# Patient Record
Sex: FEMALE | Race: OTHER | NOT HISPANIC OR LATINO
[De-identification: names, ages, dates, MRNs, and addresses within clinical notes are randomized per-mention and may not be internally consistent; named-entity substitution may affect disease eponyms.]

---

## 2017-01-07 ENCOUNTER — FORM ENCOUNTER (OUTPATIENT)
Age: 52
End: 2017-01-07

## 2017-01-08 ENCOUNTER — OUTPATIENT (OUTPATIENT)
Dept: OUTPATIENT SERVICES | Facility: HOSPITAL | Age: 52
LOS: 1 days | End: 2017-01-08
Payer: MEDICAID

## 2017-01-08 ENCOUNTER — APPOINTMENT (OUTPATIENT)
Dept: NUCLEAR MEDICINE | Facility: IMAGING CENTER | Age: 52
End: 2017-01-08

## 2017-01-08 DIAGNOSIS — C56.9 MALIGNANT NEOPLASM OF UNSPECIFIED OVARY: ICD-10-CM

## 2017-01-08 DIAGNOSIS — Z98.89 OTHER SPECIFIED POSTPROCEDURAL STATES: Chronic | ICD-10-CM

## 2017-01-08 PROCEDURE — A9552: CPT

## 2017-01-08 PROCEDURE — 78815 PET IMAGE W/CT SKULL-THIGH: CPT

## 2017-01-11 ENCOUNTER — APPOINTMENT (OUTPATIENT)
Dept: GYNECOLOGIC ONCOLOGY | Facility: CLINIC | Age: 52
End: 2017-01-11

## 2017-01-11 VITALS
WEIGHT: 156 LBS | DIASTOLIC BLOOD PRESSURE: 80 MMHG | SYSTOLIC BLOOD PRESSURE: 120 MMHG | HEIGHT: 65 IN | BODY MASS INDEX: 25.99 KG/M2

## 2017-03-09 ENCOUNTER — OUTPATIENT (OUTPATIENT)
Dept: OUTPATIENT SERVICES | Facility: HOSPITAL | Age: 52
LOS: 1 days | Discharge: HOME | End: 2017-03-09

## 2017-03-09 DIAGNOSIS — Z98.89 OTHER SPECIFIED POSTPROCEDURAL STATES: Chronic | ICD-10-CM

## 2017-03-09 DIAGNOSIS — C56.9 MALIGNANT NEOPLASM OF UNSPECIFIED OVARY: ICD-10-CM

## 2017-03-14 ENCOUNTER — APPOINTMENT (OUTPATIENT)
Dept: HEMATOLOGY ONCOLOGY | Facility: CLINIC | Age: 52
End: 2017-03-14

## 2017-03-28 ENCOUNTER — RESULT REVIEW (OUTPATIENT)
Age: 52
End: 2017-03-28

## 2017-03-29 ENCOUNTER — APPOINTMENT (OUTPATIENT)
Dept: HEMATOLOGY ONCOLOGY | Facility: CLINIC | Age: 52
End: 2017-03-29

## 2017-03-29 VITALS
HEART RATE: 94 BPM | SYSTOLIC BLOOD PRESSURE: 130 MMHG | BODY MASS INDEX: 27.96 KG/M2 | RESPIRATION RATE: 16 BRPM | WEIGHT: 167.99 LBS | OXYGEN SATURATION: 100 % | TEMPERATURE: 98.4 F | DIASTOLIC BLOOD PRESSURE: 84 MMHG

## 2017-03-29 LAB
ALBUMIN SERPL ELPH-MCNC: 3.8 G/DL
ALP BLD-CCNC: 83 U/L
ALT SERPL-CCNC: 20 U/L
ANION GAP SERPL CALC-SCNC: 15 MMOL/L
AST SERPL-CCNC: 16 U/L
BILIRUB SERPL-MCNC: <0.2 MG/DL
BUN SERPL-MCNC: 13 MG/DL
CALCIUM SERPL-MCNC: 9.2 MG/DL
CEA SERPL-MCNC: 1.5 NG/ML
CHLORIDE SERPL-SCNC: 104 MMOL/L
CO2 SERPL-SCNC: 24 MMOL/L
CREAT SERPL-MCNC: 0.56 MG/DL
GLUCOSE SERPL-MCNC: 159 MG/DL
HCT VFR BLD CALC: 33.5 % — LOW (ref 34.5–45)
HGB BLD-MCNC: 11.2 G/DL — LOW (ref 11.5–15.5)
MCHC RBC-ENTMCNC: 29.7 PG — SIGNIFICANT CHANGE UP (ref 27–34)
MCHC RBC-ENTMCNC: 33.5 G/DL — SIGNIFICANT CHANGE UP (ref 32–36)
MCV RBC AUTO: 88.8 FL — SIGNIFICANT CHANGE UP (ref 80–100)
PLATELET # BLD AUTO: 307 K/UL — SIGNIFICANT CHANGE UP (ref 150–400)
POTASSIUM SERPL-SCNC: 4.4 MMOL/L
PROT SERPL-MCNC: 6.9 G/DL
RBC # BLD: 3.77 M/UL — LOW (ref 3.8–5.2)
RBC # FLD: 11.8 % — SIGNIFICANT CHANGE UP (ref 10.3–14.5)
SODIUM SERPL-SCNC: 143 MMOL/L
WBC # BLD: 9.3 K/UL — SIGNIFICANT CHANGE UP (ref 3.8–10.5)
WBC # FLD AUTO: 9.3 K/UL — SIGNIFICANT CHANGE UP (ref 3.8–10.5)

## 2017-03-30 LAB — CANCER AG125 SERPL-ACNC: 11 U/ML

## 2017-04-05 ENCOUNTER — FORM ENCOUNTER (OUTPATIENT)
Age: 52
End: 2017-04-05

## 2017-04-06 ENCOUNTER — OUTPATIENT (OUTPATIENT)
Dept: OUTPATIENT SERVICES | Facility: HOSPITAL | Age: 52
LOS: 1 days | End: 2017-04-06
Payer: MEDICAID

## 2017-04-06 ENCOUNTER — APPOINTMENT (OUTPATIENT)
Dept: CT IMAGING | Facility: IMAGING CENTER | Age: 52
End: 2017-04-06

## 2017-04-06 ENCOUNTER — APPOINTMENT (OUTPATIENT)
Dept: MAMMOGRAPHY | Facility: IMAGING CENTER | Age: 52
End: 2017-04-06

## 2017-04-06 DIAGNOSIS — C56.9 MALIGNANT NEOPLASM OF UNSPECIFIED OVARY: ICD-10-CM

## 2017-04-06 DIAGNOSIS — Z98.89 OTHER SPECIFIED POSTPROCEDURAL STATES: Chronic | ICD-10-CM

## 2017-04-06 PROCEDURE — 77063 BREAST TOMOSYNTHESIS BI: CPT

## 2017-04-06 PROCEDURE — 74177 CT ABD & PELVIS W/CONTRAST: CPT

## 2017-04-06 PROCEDURE — 77067 SCR MAMMO BI INCL CAD: CPT

## 2017-05-03 ENCOUNTER — APPOINTMENT (OUTPATIENT)
Dept: GYNECOLOGIC ONCOLOGY | Facility: CLINIC | Age: 52
End: 2017-05-03

## 2017-05-03 VITALS
DIASTOLIC BLOOD PRESSURE: 80 MMHG | SYSTOLIC BLOOD PRESSURE: 122 MMHG | WEIGHT: 163 LBS | BODY MASS INDEX: 27.16 KG/M2 | HEIGHT: 65 IN

## 2017-05-08 ENCOUNTER — APPOINTMENT (OUTPATIENT)
Dept: SURGERY | Facility: CLINIC | Age: 52
End: 2017-05-08

## 2017-05-08 VITALS
DIASTOLIC BLOOD PRESSURE: 82 MMHG | WEIGHT: 163 LBS | SYSTOLIC BLOOD PRESSURE: 130 MMHG | TEMPERATURE: 97.9 F | HEIGHT: 65 IN | HEART RATE: 79 BPM | BODY MASS INDEX: 27.16 KG/M2

## 2017-05-08 RX ORDER — CALCIUM CITRATE/VITAMIN D3 200MG-6.25
TABLET ORAL
Refills: 0 | Status: ACTIVE | COMMUNITY

## 2017-05-11 ENCOUNTER — OUTPATIENT (OUTPATIENT)
Dept: OUTPATIENT SERVICES | Facility: HOSPITAL | Age: 52
LOS: 1 days | Discharge: HOME | End: 2017-05-11

## 2017-05-11 VITALS
RESPIRATION RATE: 16 BRPM | SYSTOLIC BLOOD PRESSURE: 115 MMHG | WEIGHT: 164.24 LBS | DIASTOLIC BLOOD PRESSURE: 75 MMHG | HEART RATE: 90 BPM | TEMPERATURE: 98 F | HEIGHT: 65 IN | OXYGEN SATURATION: 98 %

## 2017-05-11 DIAGNOSIS — Z98.89 OTHER SPECIFIED POSTPROCEDURAL STATES: Chronic | ICD-10-CM

## 2017-05-11 DIAGNOSIS — Z01.818 ENCOUNTER FOR OTHER PREPROCEDURAL EXAMINATION: ICD-10-CM

## 2017-05-11 DIAGNOSIS — Z90.710 ACQUIRED ABSENCE OF BOTH CERVIX AND UTERUS: Chronic | ICD-10-CM

## 2017-05-11 DIAGNOSIS — K43.2 INCISIONAL HERNIA WITHOUT OBSTRUCTION OR GANGRENE: ICD-10-CM

## 2017-05-11 LAB
ANION GAP SERPL CALC-SCNC: 5 MMOL/L — SIGNIFICANT CHANGE UP (ref 5–17)
APTT BLD: 32.3 SEC — SIGNIFICANT CHANGE UP (ref 27.5–37.4)
BASOPHILS # BLD AUTO: 0.1 K/UL — SIGNIFICANT CHANGE UP (ref 0–0.2)
BASOPHILS NFR BLD AUTO: 0.8 % — SIGNIFICANT CHANGE UP (ref 0–2)
BUN SERPL-MCNC: 16 MG/DL — SIGNIFICANT CHANGE UP (ref 7–23)
CALCIUM SERPL-MCNC: 9.3 MG/DL — SIGNIFICANT CHANGE UP (ref 8.5–10.1)
CHLORIDE SERPL-SCNC: 105 MMOL/L — SIGNIFICANT CHANGE UP (ref 96–108)
CO2 SERPL-SCNC: 30 MMOL/L — SIGNIFICANT CHANGE UP (ref 22–31)
CREAT SERPL-MCNC: 0.61 MG/DL — SIGNIFICANT CHANGE UP (ref 0.5–1.3)
EOSINOPHIL # BLD AUTO: 0.1 K/UL — SIGNIFICANT CHANGE UP (ref 0–0.5)
EOSINOPHIL NFR BLD AUTO: 1.7 % — SIGNIFICANT CHANGE UP (ref 0–6)
GLUCOSE SERPL-MCNC: 95 MG/DL — SIGNIFICANT CHANGE UP (ref 70–99)
HCT VFR BLD CALC: 35.8 % — SIGNIFICANT CHANGE UP (ref 34.5–45)
HGB BLD-MCNC: 12 G/DL — SIGNIFICANT CHANGE UP (ref 11.5–15.5)
INR BLD: 1.04 RATIO — SIGNIFICANT CHANGE UP (ref 0.88–1.16)
LYMPHOCYTES # BLD AUTO: 2.6 K/UL — SIGNIFICANT CHANGE UP (ref 1–3.3)
LYMPHOCYTES # BLD AUTO: 28.9 % — SIGNIFICANT CHANGE UP (ref 13–44)
MCHC RBC-ENTMCNC: 29 PG — SIGNIFICANT CHANGE UP (ref 27–34)
MCHC RBC-ENTMCNC: 33.6 GM/DL — SIGNIFICANT CHANGE UP (ref 32–36)
MCV RBC AUTO: 86.3 FL — SIGNIFICANT CHANGE UP (ref 80–100)
MONOCYTES # BLD AUTO: 0.6 K/UL — SIGNIFICANT CHANGE UP (ref 0–0.9)
MONOCYTES NFR BLD AUTO: 6.9 % — SIGNIFICANT CHANGE UP (ref 2–14)
NEUTROPHILS # BLD AUTO: 5.5 K/UL — SIGNIFICANT CHANGE UP (ref 1.8–7.4)
NEUTROPHILS NFR BLD AUTO: 61.7 % — SIGNIFICANT CHANGE UP (ref 43–77)
PLATELET # BLD AUTO: 360 K/UL — SIGNIFICANT CHANGE UP (ref 150–400)
POTASSIUM SERPL-MCNC: 4.5 MMOL/L — SIGNIFICANT CHANGE UP (ref 3.5–5.3)
POTASSIUM SERPL-SCNC: 4.5 MMOL/L — SIGNIFICANT CHANGE UP (ref 3.5–5.3)
PROTHROM AB SERPL-ACNC: 11.4 SEC — SIGNIFICANT CHANGE UP (ref 9.8–12.7)
RBC # BLD: 4.14 M/UL — SIGNIFICANT CHANGE UP (ref 3.8–5.2)
RBC # FLD: 13.4 % — SIGNIFICANT CHANGE UP (ref 11–15)
SODIUM SERPL-SCNC: 140 MMOL/L — SIGNIFICANT CHANGE UP (ref 135–145)
WBC # BLD: 9 K/UL — SIGNIFICANT CHANGE UP (ref 3.8–10.5)
WBC # FLD AUTO: 9 K/UL — SIGNIFICANT CHANGE UP (ref 3.8–10.5)

## 2017-05-11 NOTE — H&P PST ADULT - NSANTHOSAYNRD_GEN_A_CORE
No. CORNELIUS screening performed.  STOP BANG Legend: 0-2 = LOW Risk; 3-4 = INTERMEDIATE Risk; 5-8 = HIGH Risk

## 2017-05-16 ENCOUNTER — OUTPATIENT (OUTPATIENT)
Dept: OUTPATIENT SERVICES | Facility: HOSPITAL | Age: 52
LOS: 1 days | Discharge: HOME | End: 2017-05-16
Payer: COMMERCIAL

## 2017-05-16 ENCOUNTER — APPOINTMENT (OUTPATIENT)
Dept: SURGERY | Facility: HOSPITAL | Age: 52
End: 2017-05-16

## 2017-05-16 ENCOUNTER — TRANSCRIPTION ENCOUNTER (OUTPATIENT)
Age: 52
End: 2017-05-16

## 2017-05-16 VITALS
HEART RATE: 80 BPM | TEMPERATURE: 97 F | DIASTOLIC BLOOD PRESSURE: 70 MMHG | RESPIRATION RATE: 17 BRPM | WEIGHT: 164.24 LBS | OXYGEN SATURATION: 100 % | SYSTOLIC BLOOD PRESSURE: 110 MMHG | HEIGHT: 65 IN

## 2017-05-16 VITALS
DIASTOLIC BLOOD PRESSURE: 63 MMHG | RESPIRATION RATE: 16 BRPM | SYSTOLIC BLOOD PRESSURE: 96 MMHG | OXYGEN SATURATION: 98 % | HEART RATE: 75 BPM

## 2017-05-16 DIAGNOSIS — Z98.89 OTHER SPECIFIED POSTPROCEDURAL STATES: Chronic | ICD-10-CM

## 2017-05-16 DIAGNOSIS — Z90.710 ACQUIRED ABSENCE OF BOTH CERVIX AND UTERUS: Chronic | ICD-10-CM

## 2017-05-16 PROCEDURE — 49654: CPT

## 2017-05-16 PROCEDURE — 49654: CPT | Mod: AS

## 2017-05-16 RX ORDER — SODIUM CHLORIDE 9 MG/ML
1000 INJECTION, SOLUTION INTRAVENOUS
Qty: 0 | Refills: 0 | Status: DISCONTINUED | OUTPATIENT
Start: 2017-05-16 | End: 2017-05-16

## 2017-05-16 RX ORDER — MORPHINE SULFATE 50 MG/1
2 CAPSULE, EXTENDED RELEASE ORAL
Qty: 0 | Refills: 0 | Status: DISCONTINUED | OUTPATIENT
Start: 2017-05-16 | End: 2017-05-16

## 2017-05-16 RX ORDER — ONDANSETRON 8 MG/1
4 TABLET, FILM COATED ORAL ONCE
Qty: 0 | Refills: 0 | Status: DISCONTINUED | OUTPATIENT
Start: 2017-05-16 | End: 2017-05-16

## 2017-05-16 RX ADMIN — MORPHINE SULFATE 2 MILLIGRAM(S): 50 CAPSULE, EXTENDED RELEASE ORAL at 10:01

## 2017-05-16 RX ADMIN — SODIUM CHLORIDE 100 MILLILITER(S): 9 INJECTION, SOLUTION INTRAVENOUS at 10:55

## 2017-05-16 RX ADMIN — MORPHINE SULFATE 2 MILLIGRAM(S): 50 CAPSULE, EXTENDED RELEASE ORAL at 11:30

## 2017-05-16 NOTE — ASU DISCHARGE PLAN (ADULT/PEDIATRIC). - MEDICATION SUMMARY - MEDICATIONS TO CHANGE
Please order labs for 2-21 appointment. Thanks, Rae TAVERAS   I will SWITCH the dose or number of times a day I take the medications listed below when I get home from the hospital:  None

## 2017-05-16 NOTE — ASU DISCHARGE PLAN (ADULT/PEDIATRIC). - MEDICATION SUMMARY - MEDICATIONS TO TAKE
I will START or STAY ON the medications listed below when I get home from the hospital:    Percocet 5/325 325 mg-5 mg oral tablet  -- 1-2 tab(s) by mouth every 4 to 6 hours, As Needed MDD:6 tabs - for severe pain  -- Caution federal law prohibits the transfer of this drug to any person other  than the person for whom it was prescribed.  May cause drowsiness.  Alcohol may intensify this effect.  Use care when operating dangerous machinery.  This prescription cannot be refilled.  This product contains acetaminophen.  Do not use  with any other product containing acetaminophen to prevent possible liver damage.  Using more of this medication than prescribed may cause serious breathing problems.    -- Indication: For severe pain    Citracal  -- 1 tab(s) by mouth once a day  -- Indication: For .    multivitamin  -- 1 tab(s) by mouth once a day  -- Indication: For .

## 2017-05-16 NOTE — ASU DISCHARGE PLAN (ADULT/PEDIATRIC). - NOTIFY
Bleeding that does not stop/Fever greater than 101/Unable to Urinate/Swelling that continues/Persistent Nausea and Vomiting

## 2017-05-22 DIAGNOSIS — Z91.013 ALLERGY TO SEAFOOD: ICD-10-CM

## 2017-05-22 DIAGNOSIS — Z90.79 ACQUIRED ABSENCE OF OTHER GENITAL ORGAN(S): ICD-10-CM

## 2017-05-22 DIAGNOSIS — K43.2 INCISIONAL HERNIA WITHOUT OBSTRUCTION OR GANGRENE: ICD-10-CM

## 2017-05-22 DIAGNOSIS — Z85.43 PERSONAL HISTORY OF MALIGNANT NEOPLASM OF OVARY: ICD-10-CM

## 2017-05-24 ENCOUNTER — APPOINTMENT (OUTPATIENT)
Dept: SURGERY | Facility: CLINIC | Age: 52
End: 2017-05-24

## 2017-05-24 VITALS
BODY MASS INDEX: 27.16 KG/M2 | DIASTOLIC BLOOD PRESSURE: 81 MMHG | WEIGHT: 163 LBS | HEIGHT: 65 IN | SYSTOLIC BLOOD PRESSURE: 128 MMHG | TEMPERATURE: 97.5 F | HEART RATE: 94 BPM

## 2017-06-26 ENCOUNTER — APPOINTMENT (OUTPATIENT)
Dept: SURGERY | Facility: CLINIC | Age: 52
End: 2017-06-26

## 2017-06-28 ENCOUNTER — APPOINTMENT (OUTPATIENT)
Dept: SURGERY | Facility: CLINIC | Age: 52
End: 2017-06-28

## 2017-06-28 VITALS
HEART RATE: 80 BPM | DIASTOLIC BLOOD PRESSURE: 79 MMHG | HEIGHT: 65 IN | TEMPERATURE: 97.7 F | SYSTOLIC BLOOD PRESSURE: 130 MMHG | WEIGHT: 163 LBS | BODY MASS INDEX: 27.16 KG/M2

## 2017-07-05 ENCOUNTER — TRANSCRIPTION ENCOUNTER (OUTPATIENT)
Age: 52
End: 2017-07-05

## 2017-08-03 ENCOUNTER — OUTPATIENT (OUTPATIENT)
Dept: OUTPATIENT SERVICES | Facility: HOSPITAL | Age: 52
LOS: 1 days | Discharge: HOME | End: 2017-08-03

## 2017-08-03 DIAGNOSIS — C56.9 MALIGNANT NEOPLASM OF UNSPECIFIED OVARY: ICD-10-CM

## 2017-08-03 DIAGNOSIS — Z90.710 ACQUIRED ABSENCE OF BOTH CERVIX AND UTERUS: Chronic | ICD-10-CM

## 2017-08-03 DIAGNOSIS — Z98.89 OTHER SPECIFIED POSTPROCEDURAL STATES: Chronic | ICD-10-CM

## 2017-08-08 ENCOUNTER — APPOINTMENT (OUTPATIENT)
Dept: HEMATOLOGY ONCOLOGY | Facility: CLINIC | Age: 52
End: 2017-08-08
Payer: MEDICAID

## 2017-08-08 ENCOUNTER — RESULT REVIEW (OUTPATIENT)
Age: 52
End: 2017-08-08

## 2017-08-08 VITALS
RESPIRATION RATE: 16 BRPM | DIASTOLIC BLOOD PRESSURE: 76 MMHG | SYSTOLIC BLOOD PRESSURE: 112 MMHG | HEART RATE: 72 BPM | BODY MASS INDEX: 27.33 KG/M2 | TEMPERATURE: 97.5 F | OXYGEN SATURATION: 99 % | WEIGHT: 164.24 LBS

## 2017-08-08 LAB
ALBUMIN SERPL ELPH-MCNC: 4.3 G/DL
ALP BLD-CCNC: 81 U/L
ALT SERPL-CCNC: 20 U/L
ANION GAP SERPL CALC-SCNC: 14 MMOL/L
AST SERPL-CCNC: 19 U/L
BILIRUB SERPL-MCNC: 0.3 MG/DL
BUN SERPL-MCNC: 14 MG/DL
CALCIUM SERPL-MCNC: 10.1 MG/DL
CANCER AG125 SERPL-ACNC: 11 U/ML
CHLORIDE SERPL-SCNC: 109 MMOL/L
CO2 SERPL-SCNC: 22 MMOL/L
CREAT SERPL-MCNC: 0.56 MG/DL
GLUCOSE SERPL-MCNC: 117 MG/DL
HCT VFR BLD CALC: 34.5 % — SIGNIFICANT CHANGE UP (ref 34.5–45)
HGB BLD-MCNC: 11.3 G/DL — LOW (ref 11.5–15.5)
MCHC RBC-ENTMCNC: 28.4 PG — SIGNIFICANT CHANGE UP (ref 27–34)
MCHC RBC-ENTMCNC: 32.8 G/DL — SIGNIFICANT CHANGE UP (ref 32–36)
MCV RBC AUTO: 86.5 FL — SIGNIFICANT CHANGE UP (ref 80–100)
PLATELET # BLD AUTO: 286 K/UL — SIGNIFICANT CHANGE UP (ref 150–400)
POTASSIUM SERPL-SCNC: 4.7 MMOL/L
PROT SERPL-MCNC: 7.5 G/DL
RBC # BLD: 3.98 M/UL — SIGNIFICANT CHANGE UP (ref 3.8–5.2)
RBC # FLD: 14.6 % — HIGH (ref 10.3–14.5)
SODIUM SERPL-SCNC: 145 MMOL/L
WBC # BLD: 8.7 K/UL — SIGNIFICANT CHANGE UP (ref 3.8–10.5)
WBC # FLD AUTO: 8.7 K/UL — SIGNIFICANT CHANGE UP (ref 3.8–10.5)

## 2017-08-08 PROCEDURE — 99214 OFFICE O/P EST MOD 30 MIN: CPT

## 2017-08-08 RX ORDER — OXYCODONE AND ACETAMINOPHEN 5; 325 MG/1; MG/1
5-325 TABLET ORAL
Qty: 20 | Refills: 0 | Status: DISCONTINUED | COMMUNITY
Start: 2017-05-16 | End: 2017-08-08

## 2017-08-26 ENCOUNTER — TRANSCRIPTION ENCOUNTER (OUTPATIENT)
Age: 52
End: 2017-08-26

## 2017-09-07 ENCOUNTER — APPOINTMENT (OUTPATIENT)
Dept: GYNECOLOGIC ONCOLOGY | Facility: CLINIC | Age: 52
End: 2017-09-07
Payer: MEDICAID

## 2017-09-07 VITALS
SYSTOLIC BLOOD PRESSURE: 120 MMHG | HEIGHT: 65 IN | DIASTOLIC BLOOD PRESSURE: 78 MMHG | WEIGHT: 165 LBS | BODY MASS INDEX: 27.49 KG/M2

## 2017-09-07 PROCEDURE — 99214 OFFICE O/P EST MOD 30 MIN: CPT

## 2017-09-11 LAB
APPEARANCE: CLEAR
BACTERIA UR CULT: NORMAL
BACTERIA: NEGATIVE
BILIRUBIN URINE: NEGATIVE
BLOOD URINE: NEGATIVE
COLOR: YELLOW
GLUCOSE QUALITATIVE U: NORMAL MG/DL
HYALINE CASTS: 0 /LPF
KETONES URINE: NEGATIVE
LEUKOCYTE ESTERASE URINE: NEGATIVE
MICROSCOPIC-UA: NORMAL
NITRITE URINE: NEGATIVE
PH URINE: 7
PROTEIN URINE: NEGATIVE MG/DL
RED BLOOD CELLS URINE: 2 /HPF
SPECIFIC GRAVITY URINE: 1.01
SQUAMOUS EPITHELIAL CELLS: 0 /HPF
UROBILINOGEN URINE: NORMAL MG/DL
WHITE BLOOD CELLS URINE: 0 /HPF

## 2017-09-27 ENCOUNTER — LABORATORY RESULT (OUTPATIENT)
Age: 52
End: 2017-09-27

## 2017-09-27 ENCOUNTER — RESULT REVIEW (OUTPATIENT)
Age: 52
End: 2017-09-27

## 2017-09-28 ENCOUNTER — MOBILE ON CALL (OUTPATIENT)
Age: 52
End: 2017-09-28

## 2017-09-28 ENCOUNTER — APPOINTMENT (OUTPATIENT)
Dept: DERMATOLOGY | Facility: CLINIC | Age: 52
End: 2017-09-28
Payer: MEDICAID

## 2017-09-28 VITALS
SYSTOLIC BLOOD PRESSURE: 120 MMHG | DIASTOLIC BLOOD PRESSURE: 72 MMHG | BODY MASS INDEX: 27.99 KG/M2 | HEIGHT: 65 IN | WEIGHT: 168 LBS

## 2017-09-28 DIAGNOSIS — D48.5 NEOPLASM OF UNCERTAIN BEHAVIOR OF SKIN: ICD-10-CM

## 2017-09-28 PROCEDURE — 11101 BIOPSY SKIN SUBQ&/MUCOUS MEMBRANE EA ADDL LESN: CPT

## 2017-09-28 PROCEDURE — 99203 OFFICE O/P NEW LOW 30 MIN: CPT | Mod: 25

## 2017-09-28 PROCEDURE — 11100 BX SKIN SUBCUTANEOUS&/MUCOUS MEMBRANE 1 LESION: CPT

## 2017-10-14 ENCOUNTER — FORM ENCOUNTER (OUTPATIENT)
Age: 52
End: 2017-10-14

## 2017-10-15 ENCOUNTER — OUTPATIENT (OUTPATIENT)
Dept: OUTPATIENT SERVICES | Facility: HOSPITAL | Age: 52
LOS: 1 days | End: 2017-10-15
Payer: MEDICAID

## 2017-10-15 ENCOUNTER — APPOINTMENT (OUTPATIENT)
Dept: CT IMAGING | Facility: IMAGING CENTER | Age: 52
End: 2017-10-15
Payer: MEDICAID

## 2017-10-15 DIAGNOSIS — C56.9 MALIGNANT NEOPLASM OF UNSPECIFIED OVARY: ICD-10-CM

## 2017-10-15 DIAGNOSIS — Z98.89 OTHER SPECIFIED POSTPROCEDURAL STATES: Chronic | ICD-10-CM

## 2017-10-15 DIAGNOSIS — Z90.710 ACQUIRED ABSENCE OF BOTH CERVIX AND UTERUS: Chronic | ICD-10-CM

## 2017-10-15 PROCEDURE — 74177 CT ABD & PELVIS W/CONTRAST: CPT

## 2017-10-15 PROCEDURE — 74177 CT ABD & PELVIS W/CONTRAST: CPT | Mod: 26

## 2017-10-25 ENCOUNTER — OUTPATIENT (OUTPATIENT)
Dept: OUTPATIENT SERVICES | Facility: HOSPITAL | Age: 52
LOS: 1 days | Discharge: HOME | End: 2017-10-25

## 2017-10-25 DIAGNOSIS — Z90.710 ACQUIRED ABSENCE OF BOTH CERVIX AND UTERUS: Chronic | ICD-10-CM

## 2017-10-25 DIAGNOSIS — C56.9 MALIGNANT NEOPLASM OF UNSPECIFIED OVARY: ICD-10-CM

## 2017-10-25 DIAGNOSIS — Z98.89 OTHER SPECIFIED POSTPROCEDURAL STATES: Chronic | ICD-10-CM

## 2017-10-31 ENCOUNTER — APPOINTMENT (OUTPATIENT)
Dept: HEMATOLOGY ONCOLOGY | Facility: CLINIC | Age: 52
End: 2017-10-31
Payer: MEDICAID

## 2017-10-31 ENCOUNTER — RESULT REVIEW (OUTPATIENT)
Age: 52
End: 2017-10-31

## 2017-10-31 VITALS
HEART RATE: 87 BPM | RESPIRATION RATE: 16 BRPM | DIASTOLIC BLOOD PRESSURE: 77 MMHG | OXYGEN SATURATION: 99 % | WEIGHT: 166.67 LBS | SYSTOLIC BLOOD PRESSURE: 116 MMHG | BODY MASS INDEX: 27.73 KG/M2 | TEMPERATURE: 98 F

## 2017-10-31 LAB
HCT VFR BLD CALC: 34.9 % — SIGNIFICANT CHANGE UP (ref 34.5–45)
HGB BLD-MCNC: 11.5 G/DL — SIGNIFICANT CHANGE UP (ref 11.5–15.5)
MCHC RBC-ENTMCNC: 28.8 PG — SIGNIFICANT CHANGE UP (ref 27–34)
MCHC RBC-ENTMCNC: 33 G/DL — SIGNIFICANT CHANGE UP (ref 32–36)
MCV RBC AUTO: 87.5 FL — SIGNIFICANT CHANGE UP (ref 80–100)
PLATELET # BLD AUTO: 294 K/UL — SIGNIFICANT CHANGE UP (ref 150–400)
RBC # BLD: 3.99 M/UL — SIGNIFICANT CHANGE UP (ref 3.8–5.2)
RBC # FLD: 13.2 % — SIGNIFICANT CHANGE UP (ref 10.3–14.5)
WBC # BLD: 11 K/UL — HIGH (ref 3.8–10.5)
WBC # FLD AUTO: 11 K/UL — HIGH (ref 3.8–10.5)

## 2017-10-31 PROCEDURE — 99214 OFFICE O/P EST MOD 30 MIN: CPT

## 2017-11-01 LAB
ALBUMIN SERPL ELPH-MCNC: 3.8 G/DL
ALP BLD-CCNC: 82 U/L
ALT SERPL-CCNC: 25 U/L
ANION GAP SERPL CALC-SCNC: 11 MMOL/L
AST SERPL-CCNC: 21 U/L
BILIRUB SERPL-MCNC: <0.2 MG/DL
BUN SERPL-MCNC: 23 MG/DL
CALCIUM SERPL-MCNC: 9.3 MG/DL
CANCER AG125 SERPL-ACNC: 12 U/ML
CHLORIDE SERPL-SCNC: 107 MMOL/L
CO2 SERPL-SCNC: 24 MMOL/L
CREAT SERPL-MCNC: 0.61 MG/DL
GLUCOSE SERPL-MCNC: 109 MG/DL
POTASSIUM SERPL-SCNC: 4.4 MMOL/L
PROT SERPL-MCNC: 7.1 G/DL
SODIUM SERPL-SCNC: 142 MMOL/L

## 2018-02-08 ENCOUNTER — APPOINTMENT (OUTPATIENT)
Dept: GYNECOLOGIC ONCOLOGY | Facility: CLINIC | Age: 53
End: 2018-02-08
Payer: MEDICAID

## 2018-02-08 VITALS
HEIGHT: 65 IN | BODY MASS INDEX: 27.16 KG/M2 | DIASTOLIC BLOOD PRESSURE: 70 MMHG | SYSTOLIC BLOOD PRESSURE: 110 MMHG | WEIGHT: 163 LBS

## 2018-02-08 PROCEDURE — 99213 OFFICE O/P EST LOW 20 MIN: CPT

## 2018-03-02 ENCOUNTER — OUTPATIENT (OUTPATIENT)
Dept: OUTPATIENT SERVICES | Facility: HOSPITAL | Age: 53
LOS: 1 days | Discharge: HOME | End: 2018-03-02

## 2018-03-02 DIAGNOSIS — C56.9 MALIGNANT NEOPLASM OF UNSPECIFIED OVARY: ICD-10-CM

## 2018-03-02 DIAGNOSIS — Z90.710 ACQUIRED ABSENCE OF BOTH CERVIX AND UTERUS: Chronic | ICD-10-CM

## 2018-03-02 DIAGNOSIS — Z98.89 OTHER SPECIFIED POSTPROCEDURAL STATES: Chronic | ICD-10-CM

## 2018-03-08 ENCOUNTER — APPOINTMENT (OUTPATIENT)
Dept: HEMATOLOGY ONCOLOGY | Facility: CLINIC | Age: 53
End: 2018-03-08
Payer: MEDICAID

## 2018-03-08 VITALS
OXYGEN SATURATION: 99 % | RESPIRATION RATE: 16 BRPM | DIASTOLIC BLOOD PRESSURE: 75 MMHG | HEART RATE: 77 BPM | SYSTOLIC BLOOD PRESSURE: 120 MMHG | BODY MASS INDEX: 27.74 KG/M2 | TEMPERATURE: 98 F | WEIGHT: 166.67 LBS

## 2018-03-08 PROCEDURE — 99214 OFFICE O/P EST MOD 30 MIN: CPT

## 2018-03-12 LAB
ALBUMIN SERPL ELPH-MCNC: 4.4 G/DL
ALP BLD-CCNC: 76 U/L
ALT SERPL-CCNC: 25 U/L
ANION GAP SERPL CALC-SCNC: 12 MMOL/L
AST SERPL-CCNC: 21 U/L
BILIRUB SERPL-MCNC: 0.3 MG/DL
BUN SERPL-MCNC: 16 MG/DL
CALCIUM SERPL-MCNC: 9.8 MG/DL
CANCER AG125 SERPL-ACNC: 9 U/ML
CHLORIDE SERPL-SCNC: 106 MMOL/L
CO2 SERPL-SCNC: 25 MMOL/L
CREAT SERPL-MCNC: 0.68 MG/DL
GLUCOSE SERPL-MCNC: 128 MG/DL
POTASSIUM SERPL-SCNC: 4.7 MMOL/L
PROT SERPL-MCNC: 7.5 G/DL
SODIUM SERPL-SCNC: 143 MMOL/L

## 2018-04-06 ENCOUNTER — FORM ENCOUNTER (OUTPATIENT)
Age: 53
End: 2018-04-06

## 2018-04-07 ENCOUNTER — OUTPATIENT (OUTPATIENT)
Dept: OUTPATIENT SERVICES | Facility: HOSPITAL | Age: 53
LOS: 1 days | End: 2018-04-07
Payer: MEDICAID

## 2018-04-07 ENCOUNTER — APPOINTMENT (OUTPATIENT)
Dept: MAMMOGRAPHY | Facility: IMAGING CENTER | Age: 53
End: 2018-04-07
Payer: MEDICAID

## 2018-04-07 ENCOUNTER — APPOINTMENT (OUTPATIENT)
Dept: CT IMAGING | Facility: IMAGING CENTER | Age: 53
End: 2018-04-07
Payer: MEDICAID

## 2018-04-07 DIAGNOSIS — Z98.89 OTHER SPECIFIED POSTPROCEDURAL STATES: Chronic | ICD-10-CM

## 2018-04-07 DIAGNOSIS — Z90.710 ACQUIRED ABSENCE OF BOTH CERVIX AND UTERUS: Chronic | ICD-10-CM

## 2018-04-07 DIAGNOSIS — C56.9 MALIGNANT NEOPLASM OF UNSPECIFIED OVARY: ICD-10-CM

## 2018-04-07 PROCEDURE — 77063 BREAST TOMOSYNTHESIS BI: CPT

## 2018-04-07 PROCEDURE — 74177 CT ABD & PELVIS W/CONTRAST: CPT

## 2018-04-07 PROCEDURE — 74177 CT ABD & PELVIS W/CONTRAST: CPT | Mod: 26

## 2018-04-07 PROCEDURE — 77063 BREAST TOMOSYNTHESIS BI: CPT | Mod: 26

## 2018-04-07 PROCEDURE — 77067 SCR MAMMO BI INCL CAD: CPT | Mod: 26

## 2018-04-07 PROCEDURE — 77067 SCR MAMMO BI INCL CAD: CPT

## 2018-06-19 ENCOUNTER — APPOINTMENT (OUTPATIENT)
Dept: GYNECOLOGIC ONCOLOGY | Facility: CLINIC | Age: 53
End: 2018-06-19
Payer: MEDICAID

## 2018-06-19 VITALS
HEART RATE: 93 BPM | BODY MASS INDEX: 27.66 KG/M2 | WEIGHT: 166 LBS | DIASTOLIC BLOOD PRESSURE: 72 MMHG | SYSTOLIC BLOOD PRESSURE: 111 MMHG | OXYGEN SATURATION: 95 % | HEIGHT: 65 IN

## 2018-06-19 DIAGNOSIS — Z98.890 OTHER SPECIFIED POSTPROCEDURAL STATES: ICD-10-CM

## 2018-06-19 PROCEDURE — 99213 OFFICE O/P EST LOW 20 MIN: CPT

## 2018-06-26 ENCOUNTER — OUTPATIENT (OUTPATIENT)
Dept: OUTPATIENT SERVICES | Facility: HOSPITAL | Age: 53
LOS: 1 days | End: 2018-06-26
Payer: MEDICAID

## 2018-06-26 ENCOUNTER — APPOINTMENT (OUTPATIENT)
Dept: CT IMAGING | Facility: CLINIC | Age: 53
End: 2018-06-26
Payer: MEDICAID

## 2018-06-26 DIAGNOSIS — Z90.710 ACQUIRED ABSENCE OF BOTH CERVIX AND UTERUS: Chronic | ICD-10-CM

## 2018-06-26 DIAGNOSIS — Z98.89 OTHER SPECIFIED POSTPROCEDURAL STATES: Chronic | ICD-10-CM

## 2018-06-26 DIAGNOSIS — T79.2XXA TRAUMATIC SECONDARY AND RECURRENT HEMORRHAGE AND SEROMA, INITIAL ENCOUNTER: ICD-10-CM

## 2018-06-26 DIAGNOSIS — C56.9 MALIGNANT NEOPLASM OF UNSPECIFIED OVARY: ICD-10-CM

## 2018-06-26 PROCEDURE — 74177 CT ABD & PELVIS W/CONTRAST: CPT

## 2018-06-26 PROCEDURE — 74177 CT ABD & PELVIS W/CONTRAST: CPT | Mod: 26

## 2018-07-05 ENCOUNTER — RESULT REVIEW (OUTPATIENT)
Age: 53
End: 2018-07-05

## 2018-07-05 ENCOUNTER — OUTPATIENT (OUTPATIENT)
Dept: OUTPATIENT SERVICES | Facility: HOSPITAL | Age: 53
LOS: 1 days | End: 2018-07-05
Payer: MEDICAID

## 2018-07-05 ENCOUNTER — APPOINTMENT (OUTPATIENT)
Dept: ULTRASOUND IMAGING | Facility: IMAGING CENTER | Age: 53
End: 2018-07-05
Payer: MEDICAID

## 2018-07-05 DIAGNOSIS — Z00.8 ENCOUNTER FOR OTHER GENERAL EXAMINATION: ICD-10-CM

## 2018-07-05 DIAGNOSIS — Z98.89 OTHER SPECIFIED POSTPROCEDURAL STATES: Chronic | ICD-10-CM

## 2018-07-05 DIAGNOSIS — Z90.710 ACQUIRED ABSENCE OF BOTH CERVIX AND UTERUS: Chronic | ICD-10-CM

## 2018-07-05 PROCEDURE — 76942 ECHO GUIDE FOR BIOPSY: CPT

## 2018-07-05 PROCEDURE — 88173 CYTOPATH EVAL FNA REPORT: CPT | Mod: 26

## 2018-07-05 PROCEDURE — 10022: CPT

## 2018-07-05 PROCEDURE — 88173 CYTOPATH EVAL FNA REPORT: CPT

## 2018-07-05 PROCEDURE — 88172 CYTP DX EVAL FNA 1ST EA SITE: CPT

## 2018-07-05 PROCEDURE — 76942 ECHO GUIDE FOR BIOPSY: CPT | Mod: 26

## 2018-07-10 LAB — NON-GYNECOLOGICAL CYTOLOGY STUDY: SIGNIFICANT CHANGE UP

## 2018-09-05 ENCOUNTER — OUTPATIENT (OUTPATIENT)
Dept: OUTPATIENT SERVICES | Facility: HOSPITAL | Age: 53
LOS: 1 days | Discharge: HOME | End: 2018-09-05

## 2018-09-05 DIAGNOSIS — Z90.710 ACQUIRED ABSENCE OF BOTH CERVIX AND UTERUS: Chronic | ICD-10-CM

## 2018-09-05 DIAGNOSIS — Z98.89 OTHER SPECIFIED POSTPROCEDURAL STATES: Chronic | ICD-10-CM

## 2018-09-05 DIAGNOSIS — C56.9 MALIGNANT NEOPLASM OF UNSPECIFIED OVARY: ICD-10-CM

## 2018-09-12 ENCOUNTER — APPOINTMENT (OUTPATIENT)
Dept: HEMATOLOGY ONCOLOGY | Facility: CLINIC | Age: 53
End: 2018-09-12
Payer: MEDICAID

## 2018-09-12 ENCOUNTER — RESULT REVIEW (OUTPATIENT)
Age: 53
End: 2018-09-12

## 2018-09-12 VITALS
WEIGHT: 168.65 LBS | HEART RATE: 81 BPM | SYSTOLIC BLOOD PRESSURE: 123 MMHG | TEMPERATURE: 97.2 F | BODY MASS INDEX: 28.07 KG/M2 | OXYGEN SATURATION: 98 % | RESPIRATION RATE: 16 BRPM | DIASTOLIC BLOOD PRESSURE: 78 MMHG

## 2018-09-12 DIAGNOSIS — K59.00 CONSTIPATION, UNSPECIFIED: ICD-10-CM

## 2018-09-12 LAB
HCT VFR BLD CALC: 37 % — SIGNIFICANT CHANGE UP (ref 34.5–45)
HGB BLD-MCNC: 12 G/DL — SIGNIFICANT CHANGE UP (ref 11.5–15.5)
MCHC RBC-ENTMCNC: 28.2 PG — SIGNIFICANT CHANGE UP (ref 27–34)
MCHC RBC-ENTMCNC: 32.5 G/DL — SIGNIFICANT CHANGE UP (ref 32–36)
MCV RBC AUTO: 86.7 FL — SIGNIFICANT CHANGE UP (ref 80–100)
PLATELET # BLD AUTO: 279 K/UL — SIGNIFICANT CHANGE UP (ref 150–400)
RBC # BLD: 4.27 M/UL — SIGNIFICANT CHANGE UP (ref 3.8–5.2)
RBC # FLD: 13.6 % — SIGNIFICANT CHANGE UP (ref 10.3–14.5)
WBC # BLD: 9.5 K/UL — SIGNIFICANT CHANGE UP (ref 3.8–10.5)
WBC # FLD AUTO: 9.5 K/UL — SIGNIFICANT CHANGE UP (ref 3.8–10.5)

## 2018-09-12 PROCEDURE — 99214 OFFICE O/P EST MOD 30 MIN: CPT

## 2018-09-13 LAB
ALBUMIN SERPL ELPH-MCNC: 4.2 G/DL
ALP BLD-CCNC: 76 U/L
ALT SERPL-CCNC: 25 U/L
ANION GAP SERPL CALC-SCNC: 12 MMOL/L
AST SERPL-CCNC: 19 U/L
BILIRUB SERPL-MCNC: 0.3 MG/DL
BUN SERPL-MCNC: 14 MG/DL
CALCIUM SERPL-MCNC: 9.2 MG/DL
CANCER AG125 SERPL-ACNC: 11 U/ML
CHLORIDE SERPL-SCNC: 104 MMOL/L
CO2 SERPL-SCNC: 25 MMOL/L
CREAT SERPL-MCNC: 0.56 MG/DL
GLUCOSE SERPL-MCNC: 122 MG/DL
POTASSIUM SERPL-SCNC: 4.3 MMOL/L
PROT SERPL-MCNC: 6.9 G/DL
SODIUM SERPL-SCNC: 141 MMOL/L

## 2018-10-09 ENCOUNTER — APPOINTMENT (OUTPATIENT)
Dept: GYNECOLOGIC ONCOLOGY | Facility: CLINIC | Age: 53
End: 2018-10-09
Payer: MEDICAID

## 2018-10-09 VITALS — SYSTOLIC BLOOD PRESSURE: 116 MMHG | HEIGHT: 65 IN | DIASTOLIC BLOOD PRESSURE: 79 MMHG

## 2018-10-09 PROCEDURE — 99213 OFFICE O/P EST LOW 20 MIN: CPT

## 2018-12-17 ENCOUNTER — OTHER (OUTPATIENT)
Age: 53
End: 2018-12-17

## 2019-01-02 ENCOUNTER — APPOINTMENT (OUTPATIENT)
Dept: CT IMAGING | Facility: CLINIC | Age: 54
End: 2019-01-02
Payer: MEDICAID

## 2019-01-02 ENCOUNTER — OUTPATIENT (OUTPATIENT)
Dept: OUTPATIENT SERVICES | Facility: HOSPITAL | Age: 54
LOS: 1 days | End: 2019-01-02
Payer: MEDICAID

## 2019-01-02 DIAGNOSIS — C56.9 MALIGNANT NEOPLASM OF UNSPECIFIED OVARY: ICD-10-CM

## 2019-01-02 DIAGNOSIS — Z90.710 ACQUIRED ABSENCE OF BOTH CERVIX AND UTERUS: Chronic | ICD-10-CM

## 2019-01-02 DIAGNOSIS — S30.1XXA CONTUSION OF ABDOMINAL WALL, INITIAL ENCOUNTER: ICD-10-CM

## 2019-01-02 DIAGNOSIS — Z98.89 OTHER SPECIFIED POSTPROCEDURAL STATES: Chronic | ICD-10-CM

## 2019-01-02 PROCEDURE — 74177 CT ABD & PELVIS W/CONTRAST: CPT | Mod: 26

## 2019-01-02 PROCEDURE — 74177 CT ABD & PELVIS W/CONTRAST: CPT

## 2019-01-22 ENCOUNTER — OUTPATIENT (OUTPATIENT)
Dept: OUTPATIENT SERVICES | Facility: HOSPITAL | Age: 54
LOS: 1 days | End: 2019-01-22
Payer: MEDICAID

## 2019-01-22 ENCOUNTER — APPOINTMENT (OUTPATIENT)
Dept: ULTRASOUND IMAGING | Facility: IMAGING CENTER | Age: 54
End: 2019-01-22
Payer: MEDICAID

## 2019-01-22 ENCOUNTER — RESULT REVIEW (OUTPATIENT)
Age: 54
End: 2019-01-22

## 2019-01-22 DIAGNOSIS — Z90.710 ACQUIRED ABSENCE OF BOTH CERVIX AND UTERUS: Chronic | ICD-10-CM

## 2019-01-22 DIAGNOSIS — Z00.8 ENCOUNTER FOR OTHER GENERAL EXAMINATION: ICD-10-CM

## 2019-01-22 DIAGNOSIS — Z98.89 OTHER SPECIFIED POSTPROCEDURAL STATES: Chronic | ICD-10-CM

## 2019-01-22 PROCEDURE — 88305 TISSUE EXAM BY PATHOLOGIST: CPT

## 2019-01-22 PROCEDURE — 88173 CYTOPATH EVAL FNA REPORT: CPT | Mod: 26

## 2019-01-22 PROCEDURE — 88305 TISSUE EXAM BY PATHOLOGIST: CPT | Mod: 26

## 2019-01-22 PROCEDURE — 20206 BIOPSY MUSCLE PERQ NEEDLE: CPT

## 2019-01-22 PROCEDURE — 76942 ECHO GUIDE FOR BIOPSY: CPT

## 2019-01-22 PROCEDURE — 88342 IMHCHEM/IMCYTCHM 1ST ANTB: CPT | Mod: 26

## 2019-01-22 PROCEDURE — 88341 IMHCHEM/IMCYTCHM EA ADD ANTB: CPT | Mod: 26

## 2019-01-22 PROCEDURE — 76942 ECHO GUIDE FOR BIOPSY: CPT | Mod: 26

## 2019-01-22 PROCEDURE — 88341 IMHCHEM/IMCYTCHM EA ADD ANTB: CPT

## 2019-01-22 PROCEDURE — 88173 CYTOPATH EVAL FNA REPORT: CPT

## 2019-01-22 PROCEDURE — 88172 CYTP DX EVAL FNA 1ST EA SITE: CPT

## 2019-01-22 PROCEDURE — 88342 IMHCHEM/IMCYTCHM 1ST ANTB: CPT

## 2019-01-23 ENCOUNTER — APPOINTMENT (OUTPATIENT)
Dept: SURGERY | Facility: CLINIC | Age: 54
End: 2019-01-23
Payer: MEDICAID

## 2019-01-23 VITALS
TEMPERATURE: 97.4 F | HEART RATE: 88 BPM | OXYGEN SATURATION: 97 % | WEIGHT: 172.25 LBS | SYSTOLIC BLOOD PRESSURE: 145 MMHG | BODY MASS INDEX: 29.05 KG/M2 | HEIGHT: 64.5 IN | DIASTOLIC BLOOD PRESSURE: 82 MMHG

## 2019-01-23 PROCEDURE — 99213 OFFICE O/P EST LOW 20 MIN: CPT

## 2019-01-29 LAB — NON-GYNECOLOGICAL CYTOLOGY STUDY: SIGNIFICANT CHANGE UP

## 2019-03-12 ENCOUNTER — OUTPATIENT (OUTPATIENT)
Dept: OUTPATIENT SERVICES | Facility: HOSPITAL | Age: 54
LOS: 1 days | Discharge: HOME | End: 2019-03-12

## 2019-03-12 DIAGNOSIS — Z98.89 OTHER SPECIFIED POSTPROCEDURAL STATES: Chronic | ICD-10-CM

## 2019-03-12 DIAGNOSIS — Z90.710 ACQUIRED ABSENCE OF BOTH CERVIX AND UTERUS: Chronic | ICD-10-CM

## 2019-03-12 DIAGNOSIS — C56.9 MALIGNANT NEOPLASM OF UNSPECIFIED OVARY: ICD-10-CM

## 2019-03-19 ENCOUNTER — TRANSCRIPTION ENCOUNTER (OUTPATIENT)
Age: 54
End: 2019-03-19

## 2019-03-19 ENCOUNTER — APPOINTMENT (OUTPATIENT)
Dept: HEMATOLOGY ONCOLOGY | Facility: CLINIC | Age: 54
End: 2019-03-19
Payer: MEDICAID

## 2019-03-26 ENCOUNTER — RESULT REVIEW (OUTPATIENT)
Age: 54
End: 2019-03-26

## 2019-03-26 ENCOUNTER — APPOINTMENT (OUTPATIENT)
Dept: HEMATOLOGY ONCOLOGY | Facility: CLINIC | Age: 54
End: 2019-03-26
Payer: MEDICAID

## 2019-03-26 VITALS
HEART RATE: 77 BPM | TEMPERATURE: 98.1 F | DIASTOLIC BLOOD PRESSURE: 84 MMHG | SYSTOLIC BLOOD PRESSURE: 139 MMHG | OXYGEN SATURATION: 99 % | BODY MASS INDEX: 29.43 KG/M2 | RESPIRATION RATE: 16 BRPM | WEIGHT: 174.14 LBS

## 2019-03-26 LAB
HCT VFR BLD CALC: 35.2 % — SIGNIFICANT CHANGE UP (ref 34.5–45)
HGB BLD-MCNC: 12.1 G/DL — SIGNIFICANT CHANGE UP (ref 11.5–15.5)
MCHC RBC-ENTMCNC: 29.5 PG — SIGNIFICANT CHANGE UP (ref 27–34)
MCHC RBC-ENTMCNC: 34.3 G/DL — SIGNIFICANT CHANGE UP (ref 32–36)
MCV RBC AUTO: 85.8 FL — SIGNIFICANT CHANGE UP (ref 80–100)
PLATELET # BLD AUTO: 306 K/UL — SIGNIFICANT CHANGE UP (ref 150–400)
RBC # BLD: 4.11 M/UL — SIGNIFICANT CHANGE UP (ref 3.8–5.2)
RBC # FLD: 13.1 % — SIGNIFICANT CHANGE UP (ref 10.3–14.5)
WBC # BLD: 10.2 K/UL — SIGNIFICANT CHANGE UP (ref 3.8–10.5)
WBC # FLD AUTO: 10.2 K/UL — SIGNIFICANT CHANGE UP (ref 3.8–10.5)

## 2019-03-26 PROCEDURE — 99214 OFFICE O/P EST MOD 30 MIN: CPT

## 2019-03-27 LAB
ALBUMIN SERPL ELPH-MCNC: 4.2 G/DL
ALP BLD-CCNC: 78 U/L
ALT SERPL-CCNC: 15 U/L
ANION GAP SERPL CALC-SCNC: 10 MMOL/L
AST SERPL-CCNC: 15 U/L
BILIRUB SERPL-MCNC: 0.2 MG/DL
BUN SERPL-MCNC: 15 MG/DL
CALCIUM SERPL-MCNC: 9.7 MG/DL
CANCER AG125 SERPL-ACNC: 8 U/ML
CHLORIDE SERPL-SCNC: 104 MMOL/L
CO2 SERPL-SCNC: 26 MMOL/L
CREAT SERPL-MCNC: 0.56 MG/DL
GLUCOSE SERPL-MCNC: 108 MG/DL
POTASSIUM SERPL-SCNC: 4.3 MMOL/L
PROT SERPL-MCNC: 7.2 G/DL
SODIUM SERPL-SCNC: 140 MMOL/L

## 2019-03-28 ENCOUNTER — APPOINTMENT (OUTPATIENT)
Dept: INFECTIOUS DISEASE | Facility: HOSPITAL | Age: 54
End: 2019-03-28
Payer: SELF-PAY

## 2019-03-28 PROCEDURE — 90717 YELLOW FEVER VACCINE SUBQ: CPT

## 2019-03-28 PROCEDURE — 99401 PREV MED CNSL INDIV APPRX 15: CPT | Mod: 25

## 2019-03-28 PROCEDURE — 90471 IMMUNIZATION ADMIN: CPT | Mod: NC

## 2019-03-29 NOTE — HISTORY OF PRESENT ILLNESS
[Disease: _____________________] : Disease: [unfilled] [AJCC Stage: ____] : AJCC Stage: [unfilled] [de-identified] : 51-year-old female coming in for recommendation for new diagnosis of her cancer. Patient had a ferritin colonoscopy and she haven't have an ultrasound done that showed an adnexal cyst. She underwent surgical staging on September 12, 2060 and the final pathology showed stage IA, grade 2 ovarian cancer.\par Patient has recovered well from surgery. He currently is asymptomatic. [de-identified] : DOS- 9/12/16\par Genetic test:10/12/16 (49 gene panel) STK11 Variant, Unknown Significance: [id number].\par Carboplatin and Taxol 10/19/16 - 11/30/16 ( 3 cycles) [de-identified] : Patient feels well. She has no new complaints.\par She had a FNA of the abdominal wall density, benign,\par She is up to date with mammogram and colonoscopy.\par She is going on vacation to [facility].

## 2019-04-02 ENCOUNTER — APPOINTMENT (OUTPATIENT)
Dept: GYNECOLOGIC ONCOLOGY | Facility: CLINIC | Age: 54
End: 2019-04-02
Payer: MEDICAID

## 2019-04-02 VITALS
BODY MASS INDEX: 29.71 KG/M2 | SYSTOLIC BLOOD PRESSURE: 124 MMHG | DIASTOLIC BLOOD PRESSURE: 84 MMHG | HEIGHT: 64 IN | WEIGHT: 174 LBS

## 2019-04-02 DIAGNOSIS — K46.9 UNSPECIFIED ABDOMINAL HERNIA W/OUT OBSTRUCTION OR GANGRENE: ICD-10-CM

## 2019-04-02 DIAGNOSIS — Z87.448 PERSONAL HISTORY OF OTHER DISEASES OF URINARY SYSTEM: ICD-10-CM

## 2019-04-02 PROCEDURE — 99213 OFFICE O/P EST LOW 20 MIN: CPT

## 2019-04-21 ENCOUNTER — FORM ENCOUNTER (OUTPATIENT)
Age: 54
End: 2019-04-21

## 2019-04-22 ENCOUNTER — APPOINTMENT (OUTPATIENT)
Dept: MAMMOGRAPHY | Facility: IMAGING CENTER | Age: 54
End: 2019-04-22
Payer: MEDICAID

## 2019-04-22 ENCOUNTER — OUTPATIENT (OUTPATIENT)
Dept: OUTPATIENT SERVICES | Facility: HOSPITAL | Age: 54
LOS: 1 days | End: 2019-04-22
Payer: MEDICAID

## 2019-04-22 ENCOUNTER — APPOINTMENT (OUTPATIENT)
Dept: RADIOLOGY | Facility: IMAGING CENTER | Age: 54
End: 2019-04-22
Payer: MEDICAID

## 2019-04-22 DIAGNOSIS — Z98.89 OTHER SPECIFIED POSTPROCEDURAL STATES: Chronic | ICD-10-CM

## 2019-04-22 DIAGNOSIS — C56.9 MALIGNANT NEOPLASM OF UNSPECIFIED OVARY: ICD-10-CM

## 2019-04-22 DIAGNOSIS — Z90.710 ACQUIRED ABSENCE OF BOTH CERVIX AND UTERUS: Chronic | ICD-10-CM

## 2019-04-22 PROCEDURE — 77063 BREAST TOMOSYNTHESIS BI: CPT | Mod: 26

## 2019-04-22 PROCEDURE — 77080 DXA BONE DENSITY AXIAL: CPT

## 2019-04-22 PROCEDURE — 77067 SCR MAMMO BI INCL CAD: CPT | Mod: 26

## 2019-04-22 PROCEDURE — 77080 DXA BONE DENSITY AXIAL: CPT | Mod: 26

## 2019-04-22 PROCEDURE — 77063 BREAST TOMOSYNTHESIS BI: CPT

## 2019-04-22 PROCEDURE — 77067 SCR MAMMO BI INCL CAD: CPT

## 2019-04-23 DIAGNOSIS — Z00.00 ENCOUNTER FOR GENERAL ADULT MEDICAL EXAMINATION W/OUT ABNORMAL FINDINGS: ICD-10-CM

## 2019-05-06 ENCOUNTER — FORM ENCOUNTER (OUTPATIENT)
Age: 54
End: 2019-05-06

## 2019-05-07 ENCOUNTER — APPOINTMENT (OUTPATIENT)
Dept: MAMMOGRAPHY | Facility: IMAGING CENTER | Age: 54
End: 2019-05-07
Payer: MEDICAID

## 2019-05-07 ENCOUNTER — OUTPATIENT (OUTPATIENT)
Dept: OUTPATIENT SERVICES | Facility: HOSPITAL | Age: 54
LOS: 1 days | End: 2019-05-07
Payer: MEDICAID

## 2019-05-07 ENCOUNTER — APPOINTMENT (OUTPATIENT)
Dept: ULTRASOUND IMAGING | Facility: IMAGING CENTER | Age: 54
End: 2019-05-07
Payer: MEDICAID

## 2019-05-07 DIAGNOSIS — Z00.00 ENCOUNTER FOR GENERAL ADULT MEDICAL EXAMINATION WITHOUT ABNORMAL FINDINGS: ICD-10-CM

## 2019-05-07 DIAGNOSIS — Z90.710 ACQUIRED ABSENCE OF BOTH CERVIX AND UTERUS: Chronic | ICD-10-CM

## 2019-05-07 DIAGNOSIS — Z98.89 OTHER SPECIFIED POSTPROCEDURAL STATES: Chronic | ICD-10-CM

## 2019-05-07 PROCEDURE — 76641 ULTRASOUND BREAST COMPLETE: CPT

## 2019-05-07 PROCEDURE — G0279: CPT

## 2019-05-07 PROCEDURE — 77066 DX MAMMO INCL CAD BI: CPT

## 2019-05-07 PROCEDURE — 76641 ULTRASOUND BREAST COMPLETE: CPT | Mod: 26,50

## 2019-05-07 PROCEDURE — G0279: CPT | Mod: 26

## 2019-05-07 PROCEDURE — 77066 DX MAMMO INCL CAD BI: CPT | Mod: 26

## 2019-08-17 ENCOUNTER — APPOINTMENT (OUTPATIENT)
Dept: CT IMAGING | Facility: CLINIC | Age: 54
End: 2019-08-17
Payer: MEDICAID

## 2019-08-17 ENCOUNTER — OUTPATIENT (OUTPATIENT)
Dept: OUTPATIENT SERVICES | Facility: HOSPITAL | Age: 54
LOS: 1 days | End: 2019-08-17
Payer: MEDICAID

## 2019-08-17 DIAGNOSIS — Z98.89 OTHER SPECIFIED POSTPROCEDURAL STATES: Chronic | ICD-10-CM

## 2019-08-17 DIAGNOSIS — Z90.710 ACQUIRED ABSENCE OF BOTH CERVIX AND UTERUS: Chronic | ICD-10-CM

## 2019-08-17 DIAGNOSIS — C56.9 MALIGNANT NEOPLASM OF UNSPECIFIED OVARY: ICD-10-CM

## 2019-08-17 PROCEDURE — 74177 CT ABD & PELVIS W/CONTRAST: CPT

## 2019-08-17 PROCEDURE — 74177 CT ABD & PELVIS W/CONTRAST: CPT | Mod: 26

## 2019-08-30 ENCOUNTER — OTHER (OUTPATIENT)
Age: 54
End: 2019-08-30

## 2019-09-04 ENCOUNTER — OTHER (OUTPATIENT)
Age: 54
End: 2019-09-04

## 2019-09-08 ENCOUNTER — EMERGENCY (EMERGENCY)
Facility: HOSPITAL | Age: 54
LOS: 1 days | Discharge: HOME | End: 2019-09-08
Attending: EMERGENCY MEDICINE
Payer: COMMERCIAL

## 2019-09-08 VITALS
HEIGHT: 65 IN | SYSTOLIC BLOOD PRESSURE: 166 MMHG | RESPIRATION RATE: 20 BRPM | HEART RATE: 85 BPM | TEMPERATURE: 98 F | OXYGEN SATURATION: 96 % | DIASTOLIC BLOOD PRESSURE: 95 MMHG | WEIGHT: 164.02 LBS

## 2019-09-08 DIAGNOSIS — Z90.710 ACQUIRED ABSENCE OF BOTH CERVIX AND UTERUS: Chronic | ICD-10-CM

## 2019-09-08 DIAGNOSIS — Z98.89 OTHER SPECIFIED POSTPROCEDURAL STATES: Chronic | ICD-10-CM

## 2019-09-08 DIAGNOSIS — Z98.890 OTHER SPECIFIED POSTPROCEDURAL STATES: Chronic | ICD-10-CM

## 2019-09-08 LAB
ALBUMIN SERPL ELPH-MCNC: 4.4 G/DL — SIGNIFICANT CHANGE UP (ref 3.3–5.2)
ALP SERPL-CCNC: 76 U/L — SIGNIFICANT CHANGE UP (ref 40–120)
ALT FLD-CCNC: 18 U/L — SIGNIFICANT CHANGE UP
ANION GAP SERPL CALC-SCNC: 13 MMOL/L — SIGNIFICANT CHANGE UP (ref 5–17)
APPEARANCE UR: CLEAR — SIGNIFICANT CHANGE UP
AST SERPL-CCNC: 18 U/L — SIGNIFICANT CHANGE UP
BASOPHILS # BLD AUTO: 0.06 K/UL — SIGNIFICANT CHANGE UP (ref 0–0.2)
BASOPHILS NFR BLD AUTO: 0.5 % — SIGNIFICANT CHANGE UP (ref 0–2)
BILIRUB SERPL-MCNC: 0.3 MG/DL — LOW (ref 0.4–2)
BILIRUB UR-MCNC: NEGATIVE — SIGNIFICANT CHANGE UP
BLD GP AB SCN SERPL QL: SIGNIFICANT CHANGE UP
BUN SERPL-MCNC: 16 MG/DL — SIGNIFICANT CHANGE UP (ref 8–20)
CALCIUM SERPL-MCNC: 9.9 MG/DL — SIGNIFICANT CHANGE UP (ref 8.6–10.2)
CHLORIDE SERPL-SCNC: 101 MMOL/L — SIGNIFICANT CHANGE UP (ref 98–107)
CO2 SERPL-SCNC: 25 MMOL/L — SIGNIFICANT CHANGE UP (ref 22–29)
COLOR SPEC: YELLOW — SIGNIFICANT CHANGE UP
CREAT SERPL-MCNC: 0.53 MG/DL — SIGNIFICANT CHANGE UP (ref 0.5–1.3)
DIFF PNL FLD: NEGATIVE — SIGNIFICANT CHANGE UP
EOSINOPHIL # BLD AUTO: 0.12 K/UL — SIGNIFICANT CHANGE UP (ref 0–0.5)
EOSINOPHIL NFR BLD AUTO: 0.9 % — SIGNIFICANT CHANGE UP (ref 0–6)
GLUCOSE SERPL-MCNC: 126 MG/DL — HIGH (ref 70–115)
GLUCOSE UR QL: NEGATIVE MG/DL — SIGNIFICANT CHANGE UP
HCT VFR BLD CALC: 40.9 % — SIGNIFICANT CHANGE UP (ref 34.5–45)
HGB BLD-MCNC: 12.9 G/DL — SIGNIFICANT CHANGE UP (ref 11.5–15.5)
IMM GRANULOCYTES NFR BLD AUTO: 0.4 % — SIGNIFICANT CHANGE UP (ref 0–1.5)
KETONES UR-MCNC: ABNORMAL
LACTATE BLDV-MCNC: 0.6 MMOL/L — SIGNIFICANT CHANGE UP (ref 0.5–2)
LEUKOCYTE ESTERASE UR-ACNC: NEGATIVE — SIGNIFICANT CHANGE UP
LIDOCAIN IGE QN: 13 U/L — LOW (ref 22–51)
LYMPHOCYTES # BLD AUTO: 1.57 K/UL — SIGNIFICANT CHANGE UP (ref 1–3.3)
LYMPHOCYTES # BLD AUTO: 12.3 % — LOW (ref 13–44)
MCHC RBC-ENTMCNC: 27.7 PG — SIGNIFICANT CHANGE UP (ref 27–34)
MCHC RBC-ENTMCNC: 31.5 GM/DL — LOW (ref 32–36)
MCV RBC AUTO: 87.8 FL — SIGNIFICANT CHANGE UP (ref 80–100)
MONOCYTES # BLD AUTO: 0.63 K/UL — SIGNIFICANT CHANGE UP (ref 0–0.9)
MONOCYTES NFR BLD AUTO: 5 % — SIGNIFICANT CHANGE UP (ref 2–14)
NEUTROPHILS # BLD AUTO: 10.29 K/UL — HIGH (ref 1.8–7.4)
NEUTROPHILS NFR BLD AUTO: 80.9 % — HIGH (ref 43–77)
NITRITE UR-MCNC: NEGATIVE — SIGNIFICANT CHANGE UP
PH UR: 7 — SIGNIFICANT CHANGE UP (ref 5–8)
PLATELET # BLD AUTO: 337 K/UL — SIGNIFICANT CHANGE UP (ref 150–400)
POTASSIUM SERPL-MCNC: 4 MMOL/L — SIGNIFICANT CHANGE UP (ref 3.5–5.3)
POTASSIUM SERPL-SCNC: 4 MMOL/L — SIGNIFICANT CHANGE UP (ref 3.5–5.3)
PROT SERPL-MCNC: 8.3 G/DL — SIGNIFICANT CHANGE UP (ref 6.6–8.7)
PROT UR-MCNC: NEGATIVE MG/DL — SIGNIFICANT CHANGE UP
RBC # BLD: 4.66 M/UL — SIGNIFICANT CHANGE UP (ref 3.8–5.2)
RBC # FLD: 14.1 % — SIGNIFICANT CHANGE UP (ref 10.3–14.5)
SODIUM SERPL-SCNC: 139 MMOL/L — SIGNIFICANT CHANGE UP (ref 135–145)
SP GR SPEC: 1.01 — SIGNIFICANT CHANGE UP (ref 1.01–1.02)
UROBILINOGEN FLD QL: NEGATIVE MG/DL — SIGNIFICANT CHANGE UP
WBC # BLD: 12.72 K/UL — HIGH (ref 3.8–10.5)
WBC # FLD AUTO: 12.72 K/UL — HIGH (ref 3.8–10.5)

## 2019-09-08 PROCEDURE — 74177 CT ABD & PELVIS W/CONTRAST: CPT

## 2019-09-08 PROCEDURE — 99284 EMERGENCY DEPT VISIT MOD MDM: CPT | Mod: 25

## 2019-09-08 PROCEDURE — 81003 URINALYSIS AUTO W/O SCOPE: CPT

## 2019-09-08 PROCEDURE — 96375 TX/PRO/DX INJ NEW DRUG ADDON: CPT

## 2019-09-08 PROCEDURE — 85027 COMPLETE CBC AUTOMATED: CPT

## 2019-09-08 PROCEDURE — 36415 COLL VENOUS BLD VENIPUNCTURE: CPT

## 2019-09-08 PROCEDURE — 74018 RADEX ABDOMEN 1 VIEW: CPT

## 2019-09-08 PROCEDURE — 99285 EMERGENCY DEPT VISIT HI MDM: CPT

## 2019-09-08 PROCEDURE — 80053 COMPREHEN METABOLIC PANEL: CPT

## 2019-09-08 PROCEDURE — 86901 BLOOD TYPING SEROLOGIC RH(D): CPT

## 2019-09-08 PROCEDURE — 86850 RBC ANTIBODY SCREEN: CPT

## 2019-09-08 PROCEDURE — 83690 ASSAY OF LIPASE: CPT

## 2019-09-08 PROCEDURE — 74177 CT ABD & PELVIS W/CONTRAST: CPT | Mod: 26

## 2019-09-08 PROCEDURE — 96374 THER/PROPH/DIAG INJ IV PUSH: CPT | Mod: XU

## 2019-09-08 PROCEDURE — 83605 ASSAY OF LACTIC ACID: CPT

## 2019-09-08 PROCEDURE — 86900 BLOOD TYPING SEROLOGIC ABO: CPT

## 2019-09-08 RX ORDER — SODIUM CHLORIDE 9 MG/ML
1000 INJECTION INTRAMUSCULAR; INTRAVENOUS; SUBCUTANEOUS ONCE
Refills: 0 | Status: COMPLETED | OUTPATIENT
Start: 2019-09-08 | End: 2019-09-08

## 2019-09-08 RX ORDER — ONDANSETRON 8 MG/1
4 TABLET, FILM COATED ORAL ONCE
Refills: 0 | Status: COMPLETED | OUTPATIENT
Start: 2019-09-08 | End: 2019-09-08

## 2019-09-08 RX ORDER — DIPHENHYDRAMINE HCL 50 MG
25 CAPSULE ORAL ONCE
Refills: 0 | Status: COMPLETED | OUTPATIENT
Start: 2019-09-08 | End: 2019-09-08

## 2019-09-08 RX ORDER — MORPHINE SULFATE 50 MG/1
2 CAPSULE, EXTENDED RELEASE ORAL ONCE
Refills: 0 | Status: DISCONTINUED | OUTPATIENT
Start: 2019-09-08 | End: 2019-09-08

## 2019-09-08 RX ADMIN — MORPHINE SULFATE 2 MILLIGRAM(S): 50 CAPSULE, EXTENDED RELEASE ORAL at 14:04

## 2019-09-08 RX ADMIN — MORPHINE SULFATE 2 MILLIGRAM(S): 50 CAPSULE, EXTENDED RELEASE ORAL at 14:34

## 2019-09-08 RX ADMIN — SODIUM CHLORIDE 1000 MILLILITER(S): 9 INJECTION INTRAMUSCULAR; INTRAVENOUS; SUBCUTANEOUS at 14:05

## 2019-09-08 RX ADMIN — ONDANSETRON 4 MILLIGRAM(S): 8 TABLET, FILM COATED ORAL at 14:05

## 2019-09-08 RX ADMIN — Medication 125 MILLIGRAM(S): at 14:04

## 2019-09-08 NOTE — ED STATDOCS - CLINICAL SUMMARY MEDICAL DECISION MAKING FREE TEXT BOX
pt with RLQ hernia which has been repaired now enlarged and now with pain and vomiting, labs, IV, CT abdomen, medicate and re-evaluate pt

## 2019-09-08 NOTE — ED STATDOCS - ATTENDING CONTRIBUTION TO CARE
I, Pricilla Jurado, performed the initial face to face bedside interview with this patient regarding history of present illness, review of symptoms and relevant past medical, social and family history.  I completed an independent physical examination.  I was the initial provider who evaluated this patient. I have signed out the follow up of any pending tests (i.e. labs, radiological studies) to the ACP.  I have communicated the patient’s plan of care and disposition with the ACP.  The history, relevant review of systems, past medical and surgical history, medical decision making, and physical examination was documented by the scribe in my presence and I attest to the accuracy of the documentation.

## 2019-09-08 NOTE — ED STATDOCS - OBJECTIVE STATEMENT
55 y/o F pt with hx of ovarian CA (last treatment 3 years ago), hysterectomy, Hernia mesh (Dr. MYNOR Castorena at [facility]) presents to ED c/o right lower abdominal pain with bloating associated with nausea, 9 episodes  vomiting, chills, fevers  since Thursday. Pt has CT scan August 19th shows Increase in Hernia. Scheduled PET scan tomorrow. No hx of blood transfusion. No diarrhea, no urinary symptoms. No further complaints at this time.

## 2019-09-08 NOTE — ED STATDOCS - PROGRESS NOTE DETAILS
NP NOTE:  HPI, ROS, PE of intake doctor reviewed.  Patient states she feels much better, pain resolved.  Labs reviewed.  CT scan pending PT seen resting comfortably in no acute distress, no acute complaints at this time, PT tolerating PO intake,  PT informed of all results, pt informed of possibility of change in radiology read after official read, PT seen by ACS cleared for out pt follow up after pt tolerated PO, PT pain controlled PT will be DC home with out pt follow up to ACS clinic pt educated about when to return to the ED if needed. PT verbalizes that he understands all instructions and results. PT seen resting comfortably in no acute distress, no acute complaints at this time, PT tolerating PO intake,  PT informed of all results, pt informed of possibility of change in radiology read after official read, PT seen by ACS cleared for out pt follow up after pt tolerated PO, pt able to pass flatus, PT pain controlled PT will be DC home with out pt follow up to ACS clinic pt educated about when to return to the ED if needed. PT verbalizes that he understands all instructions and results.

## 2019-09-08 NOTE — ED STATDOCS - PATIENT PORTAL LINK FT
You can access the FollowMyHealth Patient Portal offered by [facility] by registering at the following website: [email or web address]. By joining [employer]’s FollowMyHealth portal, you will also be able to view your health information using other applications (apps) compatible with our system.

## 2019-09-08 NOTE — ED ADULT NURSE NOTE - OBJECTIVE STATEMENT
pt A&Ox4, c/o RLQ abd pain, bloating, n/v, and fever/chills that started Thursday, pt had CT done 8/19 which showed increase in her abd hernia and is suppose to have a PET scan tomorrow, pt has hx of hernia mesh

## 2019-09-08 NOTE — ED ADULT TRIAGE NOTE - CHIEF COMPLAINT QUOTE
pt a+ox3, reports abd pain since Thursday. RLQ bulge noted. pt reports hx ovarian cancer with hysterectomy.

## 2019-09-09 VITALS
SYSTOLIC BLOOD PRESSURE: 152 MMHG | HEART RATE: 83 BPM | OXYGEN SATURATION: 100 % | RESPIRATION RATE: 18 BRPM | TEMPERATURE: 98 F | DIASTOLIC BLOOD PRESSURE: 83 MMHG

## 2019-09-09 PROCEDURE — 74018 RADEX ABDOMEN 1 VIEW: CPT | Mod: 26

## 2019-09-09 NOTE — CONSULT NOTE ADULT - ASSESSMENT
ASSESSMENT: Patient is a 54y old female with ventral hernia and abdominal pain that has resolved. Patient had a CT scan that demonstrated early SBO but physical exam was non concerning. Patient was also passing gas and having daily BM. KUB demonstrated contrast in transverse colon.     PLAN:    - no surgical intervention required at this moment  -f/u with ACS in office in 2 weeks ASSESSMENT: Patient is a 54y old female with ventral hernia and abdominal pain that has resolved. Patient had a CT scan that demonstrated early SBO but physical exam was non concerning. Patient was also passing gas and having daily BM. KUB demonstrated contrast in transverse colon.     PLAN:    - no surgical intervention required at this moment  - PO trail, if passes then f/u in office in 2 weeks.

## 2019-09-09 NOTE — ED ADULT NURSE REASSESSMENT NOTE - NS ED NURSE REASSESS COMMENT FT1
Pt Given clears per Pa order. Pt drank with no difficulty. Denies nausea vomiting. Denies pain. PA made aware, No distress noted.

## 2019-09-09 NOTE — CONSULT NOTE ADULT - SUBJECTIVE AND OBJECTIVE BOX
ACUTE CARE SURGERY CONSULT     HPI: 54y Female with PMH of hysterectomy complicated by ventral hernia and repaired by Dr. Castorena at an outside hospital that came to the ED with abdominal pain at her hernia site. Akira reports that she had some pain on Thursday and it went away and today it returned but its tolerable and had resolved at the moment of physical exam. Patient states that her last BM was this AM and was passing gas. Last meal was in the AM. Patient went to an outside facility after her abdominal pain on Thursday and was found to have an enlarged pelvic lymph node that will be followed up today with a PET scan ordered by PCP. Denies fever, chills, nausea, vomiting, chest pain, and sob.     ROS: 10-system review is otherwise negative except HPI above.      PAST MEDICAL & SURGICAL HISTORY:  Ovarian cancer  History of hysterectomy  H/O hernia repair: with mesh    SOCIAL HISTORY: Denies any toxic habits    ALLERGIES: NKA No Known Drug Allergies  Seafood (Unknown)      HOME MEDICATIONS: Denies  --------------------------------------------------------------------------------------------    PHYSICAL EXAM:   General: NAD, Lying in bed comfortably  Neuro: A+Ox3  HEENT: NC/AT, EOMI, PERRLA, MMM  Cardio: RRR  Resp: Non labored breathing   GI/Abd: Soft, NT/ND, no rebound/guarding, no masses palpated. RLQ reducible hernia non tender to palpation.   Vascular: All 4 extremities warm.  Pelvis: stable  Musculoskeletal: All 4 extremities moving spontaneously, no limitations, no spinal tenderness or stepoffs  --------------------------------------------------------------------------------------------    LABS                 12.9   12.72  )----------(  337       ( 08 Sep 2019 13:58 )               40.9      139    |  101    |  16.0   ----------------------------<  126        ( 08 Sep 2019 13:58 )  4.0     |  25.0   |  0.53     Ca    9.9        ( 08 Sep 2019 13:58 )    TPro  8.3    /  Alb  4.4    /  TBili  0.3    /  DBili  x      /  AST  18     /  ALT  18     /  AlkPhos  76     ( 08 Sep 2019 13:58 )    LIVER FUNCTIONS - ( 08 Sep 2019 13:58 )  Alb: 4.4 g/dL / Pro: 8.3 g/dL / ALK PHOS: 76 U/L / ALT: 18 U/L / AST: 18 U/L / GGT: x               CAPILLARY BLOOD GLUCOSE        Urinalysis Basic - ( 08 Sep 2019 16:38 )    Color: Yellow / Appearance: Clear / S.010 / pH: x  Gluc: x / Ketone: Small  / Bili: Negative / Urobili: Negative mg/dL   Blood: x / Protein: Negative mg/dL / Nitrite: Negative   Leuk Esterase: Negative / RBC: x / WBC x   Sq Epi: x / Non Sq Epi: x / Bacteria: x        13:58 - VBG - pH:       | pCO2:       | pO2:       | Lactate: 0.6      --------------------------------------------------------------------------------------------  IMAGING  CT abd: ventral hernia with small bowel and possible early SBO.  KUB: contrast seen in transverse colon

## 2019-09-09 NOTE — CONSULT NOTE ADULT - ATTENDING COMMENTS
Case discussed with resident team this morning.     No emergent surgical intervention indicated.  Follow-up in office for operative evaluation/planning.

## 2019-09-10 ENCOUNTER — EMERGENCY (EMERGENCY)
Facility: HOSPITAL | Age: 54
LOS: 1 days | Discharge: HOME | End: 2019-09-10
Attending: EMERGENCY MEDICINE
Payer: COMMERCIAL

## 2019-09-10 VITALS
DIASTOLIC BLOOD PRESSURE: 97 MMHG | TEMPERATURE: 98 F | HEART RATE: 88 BPM | HEIGHT: 65 IN | WEIGHT: 164.91 LBS | SYSTOLIC BLOOD PRESSURE: 160 MMHG | RESPIRATION RATE: 20 BRPM | OXYGEN SATURATION: 98 %

## 2019-09-10 VITALS
SYSTOLIC BLOOD PRESSURE: 138 MMHG | RESPIRATION RATE: 20 BRPM | TEMPERATURE: 98 F | OXYGEN SATURATION: 99 % | HEART RATE: 68 BPM | DIASTOLIC BLOOD PRESSURE: 79 MMHG

## 2019-09-10 DIAGNOSIS — Z90.710 ACQUIRED ABSENCE OF BOTH CERVIX AND UTERUS: Chronic | ICD-10-CM

## 2019-09-10 DIAGNOSIS — Z98.890 OTHER SPECIFIED POSTPROCEDURAL STATES: Chronic | ICD-10-CM

## 2019-09-10 DIAGNOSIS — K43.9 VENTRAL HERNIA WITHOUT OBSTRUCTION OR GANGRENE: ICD-10-CM

## 2019-09-10 DIAGNOSIS — Z98.89 OTHER SPECIFIED POSTPROCEDURAL STATES: Chronic | ICD-10-CM

## 2019-09-10 PROBLEM — C56.9 MALIGNANT NEOPLASM OF UNSPECIFIED OVARY: Chronic | Status: ACTIVE | Noted: 2019-09-08

## 2019-09-10 LAB
ALBUMIN SERPL ELPH-MCNC: 4.4 G/DL — SIGNIFICANT CHANGE UP (ref 3.3–5.2)
ALP SERPL-CCNC: 73 U/L — SIGNIFICANT CHANGE UP (ref 40–120)
ALT FLD-CCNC: 15 U/L — SIGNIFICANT CHANGE UP
ANION GAP SERPL CALC-SCNC: 12 MMOL/L — SIGNIFICANT CHANGE UP (ref 5–17)
APTT BLD: 30.9 SEC — SIGNIFICANT CHANGE UP (ref 27.5–36.3)
AST SERPL-CCNC: 19 U/L — SIGNIFICANT CHANGE UP
BASOPHILS # BLD AUTO: 0.05 K/UL — SIGNIFICANT CHANGE UP (ref 0–0.2)
BASOPHILS NFR BLD AUTO: 0.4 % — SIGNIFICANT CHANGE UP (ref 0–2)
BILIRUB SERPL-MCNC: 0.4 MG/DL — SIGNIFICANT CHANGE UP (ref 0.4–2)
BLD GP AB SCN SERPL QL: SIGNIFICANT CHANGE UP
BUN SERPL-MCNC: 13 MG/DL — SIGNIFICANT CHANGE UP (ref 8–20)
CALCIUM SERPL-MCNC: 9.9 MG/DL — SIGNIFICANT CHANGE UP (ref 8.6–10.2)
CHLORIDE SERPL-SCNC: 102 MMOL/L — SIGNIFICANT CHANGE UP (ref 98–107)
CO2 SERPL-SCNC: 25 MMOL/L — SIGNIFICANT CHANGE UP (ref 22–29)
CREAT SERPL-MCNC: 0.55 MG/DL — SIGNIFICANT CHANGE UP (ref 0.5–1.3)
EOSINOPHIL # BLD AUTO: 0.06 K/UL — SIGNIFICANT CHANGE UP (ref 0–0.5)
EOSINOPHIL NFR BLD AUTO: 0.5 % — SIGNIFICANT CHANGE UP (ref 0–6)
GLUCOSE SERPL-MCNC: 145 MG/DL — HIGH (ref 70–115)
HCT VFR BLD CALC: 40.1 % — SIGNIFICANT CHANGE UP (ref 34.5–45)
HGB BLD-MCNC: 12.8 G/DL — SIGNIFICANT CHANGE UP (ref 11.5–15.5)
IMM GRANULOCYTES NFR BLD AUTO: 0.5 % — SIGNIFICANT CHANGE UP (ref 0–1.5)
INR BLD: 1.1 RATIO — SIGNIFICANT CHANGE UP (ref 0.88–1.16)
LACTATE BLDV-MCNC: 1 MMOL/L — SIGNIFICANT CHANGE UP (ref 0.5–2)
LYMPHOCYTES # BLD AUTO: 19.3 % — SIGNIFICANT CHANGE UP (ref 13–44)
LYMPHOCYTES # BLD AUTO: 2.5 K/UL — SIGNIFICANT CHANGE UP (ref 1–3.3)
MCHC RBC-ENTMCNC: 27.6 PG — SIGNIFICANT CHANGE UP (ref 27–34)
MCHC RBC-ENTMCNC: 31.9 GM/DL — LOW (ref 32–36)
MCV RBC AUTO: 86.4 FL — SIGNIFICANT CHANGE UP (ref 80–100)
MONOCYTES # BLD AUTO: 0.86 K/UL — SIGNIFICANT CHANGE UP (ref 0–0.9)
MONOCYTES NFR BLD AUTO: 6.6 % — SIGNIFICANT CHANGE UP (ref 2–14)
NEUTROPHILS # BLD AUTO: 9.43 K/UL — HIGH (ref 1.8–7.4)
NEUTROPHILS NFR BLD AUTO: 72.7 % — SIGNIFICANT CHANGE UP (ref 43–77)
PLATELET # BLD AUTO: 337 K/UL — SIGNIFICANT CHANGE UP (ref 150–400)
POTASSIUM SERPL-MCNC: 4.1 MMOL/L — SIGNIFICANT CHANGE UP (ref 3.5–5.3)
POTASSIUM SERPL-SCNC: 4.1 MMOL/L — SIGNIFICANT CHANGE UP (ref 3.5–5.3)
PROT SERPL-MCNC: 7.7 G/DL — SIGNIFICANT CHANGE UP (ref 6.6–8.7)
PROTHROM AB SERPL-ACNC: 12.7 SEC — SIGNIFICANT CHANGE UP (ref 10–12.9)
RBC # BLD: 4.64 M/UL — SIGNIFICANT CHANGE UP (ref 3.8–5.2)
RBC # FLD: 14.1 % — SIGNIFICANT CHANGE UP (ref 10.3–14.5)
SODIUM SERPL-SCNC: 139 MMOL/L — SIGNIFICANT CHANGE UP (ref 135–145)
WBC # BLD: 12.96 K/UL — HIGH (ref 3.8–10.5)
WBC # FLD AUTO: 12.96 K/UL — HIGH (ref 3.8–10.5)

## 2019-09-10 PROCEDURE — 36415 COLL VENOUS BLD VENIPUNCTURE: CPT

## 2019-09-10 PROCEDURE — 85027 COMPLETE CBC AUTOMATED: CPT

## 2019-09-10 PROCEDURE — 96374 THER/PROPH/DIAG INJ IV PUSH: CPT

## 2019-09-10 PROCEDURE — 74177 CT ABD & PELVIS W/CONTRAST: CPT

## 2019-09-10 PROCEDURE — 85610 PROTHROMBIN TIME: CPT

## 2019-09-10 PROCEDURE — 83605 ASSAY OF LACTIC ACID: CPT

## 2019-09-10 PROCEDURE — 74177 CT ABD & PELVIS W/CONTRAST: CPT | Mod: 26

## 2019-09-10 PROCEDURE — 86850 RBC ANTIBODY SCREEN: CPT

## 2019-09-10 PROCEDURE — 74018 RADEX ABDOMEN 1 VIEW: CPT | Mod: 26

## 2019-09-10 PROCEDURE — 74018 RADEX ABDOMEN 1 VIEW: CPT

## 2019-09-10 PROCEDURE — 86900 BLOOD TYPING SEROLOGIC ABO: CPT

## 2019-09-10 PROCEDURE — 86901 BLOOD TYPING SEROLOGIC RH(D): CPT

## 2019-09-10 PROCEDURE — 80053 COMPREHEN METABOLIC PANEL: CPT

## 2019-09-10 PROCEDURE — 99284 EMERGENCY DEPT VISIT MOD MDM: CPT | Mod: 25

## 2019-09-10 PROCEDURE — 85730 THROMBOPLASTIN TIME PARTIAL: CPT

## 2019-09-10 PROCEDURE — 99285 EMERGENCY DEPT VISIT HI MDM: CPT

## 2019-09-10 PROCEDURE — 99283 EMERGENCY DEPT VISIT LOW MDM: CPT

## 2019-09-10 RX ORDER — ONDANSETRON 8 MG/1
1 TABLET, FILM COATED ORAL
Qty: 15 | Refills: 0
Start: 2019-09-10 | End: 2019-09-14

## 2019-09-10 RX ORDER — MORPHINE SULFATE 50 MG/1
4 CAPSULE, EXTENDED RELEASE ORAL ONCE
Refills: 0 | Status: DISCONTINUED | OUTPATIENT
Start: 2019-09-10 | End: 2019-09-10

## 2019-09-10 RX ADMIN — MORPHINE SULFATE 4 MILLIGRAM(S): 50 CAPSULE, EXTENDED RELEASE ORAL at 02:04

## 2019-09-10 NOTE — ED PROVIDER NOTE - PATIENT PORTAL LINK FT
You can access the FollowMyHealth Patient Portal offered by [facility] by registering at the following website: [email or web address]. By joining [employer]’s FollowMyHealth portal, you will also be able to view your health information using other applications (apps) compatible with our system.

## 2019-09-10 NOTE — ED PROVIDER NOTE - PSH
H/O hernia repair  with mesh  H/O laparoscopy  for infertility 2007  History of hysterectomy    S/P hysterectomy  September 2016, with oophorectomy

## 2019-09-10 NOTE — ED PROVIDER NOTE - OBJECTIVE STATEMENT
contued worse pain, feels as thogh hernia is getting hard,   no Vommirting, no BM, able to pass flatus today. PT with SPMHX of        presents to the ED with complaint of contued worse pain, feels as thogh hernia is getting hard,   no Vommirting, no BM, able to pass flatus today. PT with SPMHX of ovarian CA (last treatment 3 years ago), hysterectomy, Hernia mesh (Dr. MYNOR Castorena at [facility]) presents to the ED with complaint of RLQ pain. PT states that her pain from last visit has returned, and that she feels her hernia has become firm. PT states that she is ha continued worse pain, feels as though hernia is getting hard. Pt states that she is having moderate amount of non radiating pain that feel sharp in nature that is not improved or made worse by anything PT states that she has not had a BM, is able to pass flatus today. PT admits to nausea, vomiting, chills, PT denies weakness, back pain, Ha, dizziness.

## 2019-09-10 NOTE — CONSULT NOTE ADULT - SUBJECTIVE AND OBJECTIVE BOX
56 year old female presenting with chief complaint of abdominal pain since . Patient seen and examined at bedside with on call trauma attending. Reports that she has had this ventral hernia for the past 6 months but started experiencing worsening pain from it in the past 2 weeks prompting her to come to [facility] ED over the weekend where she was seen by ACS/Trauma team. Working diagnosis at the time thought to be an early SBO and subsequent KUB showed contrast in colon. Patient passed PO challenge and was discharged. Reports that her pain has been unchanged since the discharge. Arrived in ED reporting that her hernia was "hard and painful".  Reports that she is still passing flatus and last BM was  night (has regular daily BMs) ROS negative for fever, chills, nausea, vomiting, chest pain, dyspnea, SOB or dysuria. Of note, patient reports that she has an elnarged pelvic lymph node that is being followed by her surg onc team and was scheduled to undergo a PET scan today (9/10)     PMHx: Ovarian CA   PSHx: Open hysterectomy (2016) repair of ventral hernia with mesh () @ [facility] by Dr. Castorena   Allergies Food   Meds: None  FamHx: Non contributory   SocHx: Denies toxic habits x 3       VITALS & I/Os:  Vital Signs Last 24 Hrs  T(C): 36.7 (10 Sep 2019 00:35), Max: 36.7 (10 Sep 2019 00:35)  T(F): 98.1 (10 Sep 2019 00:35), Max: 98.1 (10 Sep 2019 00:35)  HR: 88 (10 Sep 2019 00:35) (88 - 88)  BP: 160/97 (10 Sep 2019 00:35) (160/97 - 160/97)  BP(mean): --  RR: 20 (10 Sep 2019 00:35) (20 - 20)  SpO2: 98% (10 Sep 2019 00:35) (98% - 98%)  CAPILLARY BLOOD GLUCOSE    I&O's Summary    Constitutional: Patient resting comfortably in bed in no acute distress   HEENT: EOMI/PERRL b/l  Neck: No JVD, full ROM w/o pain  Respiratory: CTAB with unlabored respirations, no accessory muscle use or conversational dyspnea   Cardiovascular: Regular rate and rhythm with no arrythmias or murmurs  Gastrointestinal: Well healed ex lap scar, incisional/ventral hernia present that is soft and easily reducible with no overlying skin changes, abdomen soft, non-tender, non-distended with no rebound tenderness or guarding   Rectal: Not indicated   Neurological: GCS 15 (E4V5M6) with no gross sensory, motor or coordination deficits   Psychiatric: Normal mood and affect   Musculoskeletal: No joint pain, swelling or deformity with no limitation of movement       LABS:                        12.8   12.96 )-----------( 337      ( 10 Sep 2019 02:07 )             40.1     09-10    139  |  102  |  13.0  ----------------------------<  145<H>  4.1   |  25.0  |  0.55    Ca    9.9      10 Sep 2019 02:07    TPro  7.7  /  Alb  4.4  /  TBili  0.4  /  DBili  x   /  AST  19  /  ALT  15  /  AlkPhos  73  -10    Lactate:    PT/INR - ( 10 Sep 2019 02:07 )   PT: 12.7 sec;   INR: 1.10 ratio         PTT - ( 10 Sep 2019 02:07 )  PTT:30.9 sec        09-10 @ 01:57  1.0    Urinalysis Basic - ( 08 Sep 2019 16:38 )    Color: Yellow / Appearance: Clear / S.010 / pH: x  Gluc: x / Ketone: Small  / Bili: Negative / Urobili: Negative mg/dL   Blood: x / Protein: Negative mg/dL / Nitrite: Negative   Leuk Esterase: Negative / RBC: x / WBC x   Sq Epi: x / Non Sq Epi: x / Bacteria: x

## 2019-09-10 NOTE — CONSULT NOTE ADULT - ATTENDING COMMENTS
avss currently  abd soft reducible ventral hernia currently , minimal ttp to area  labs / imaging reviewed  d/w the patient and her .  the need for repair of hernia , pt would like to return to her surgeon. d/w as well if sx return or worsen then would need emergent repair and to return to er. pt is also scheduled for PET this week.

## 2019-09-10 NOTE — ED ADULT TRIAGE NOTE - CHIEF COMPLAINT QUOTE
Abdominal pain, Was just seen recently for same.  States there is a mesh that is blocking intestine.  Was told to return if pain came back.  Denies nausea/vomiting/diarrhea, states when she had this pain last week there was vomiting.

## 2019-09-10 NOTE — ED PROVIDER NOTE - PROGRESS NOTE DETAILS
Jaydon: pt states that she feels better, abd is soft and non-tender, she has not vomited, she has tolerated PO, KUB with contrast through to large bowel, stable for dc

## 2019-09-10 NOTE — ED PROVIDER NOTE - ABDOMINAL EXAM
no pulsating masses/no organomegaly/no guarding no rebound. PT with firm mass in RLQ/soft/nondistended/tender...

## 2019-09-10 NOTE — CONSULT NOTE ADULT - ASSESSMENT
57 year old female with recurrent non incarcerated non strangulated ventral hernia with no clinical signs of obstruction at this time currently afebrile and hemodynamically stable. Patient advised to arrange for already scheduled PET scan in order to make sure that her surg onc team has no acute plans to operate as that may affect timing of her required ventral hernia repair.

## 2019-09-10 NOTE — CONSULT NOTE ADULT - PROBLEM SELECTOR RECOMMENDATION 9
1. Obtain KUB darrell. 6hrs post CT scan i.e. 8am vs 9am today to follow contrast pattern   2. PO challenge in the ED   3. Patient requesting to f/u with her prior surgeon for elective ventral hernia repair, however, knows to return to ED with any concerning symptoms

## 2019-09-10 NOTE — ED PROVIDER NOTE - ATTENDING CONTRIBUTION TO CARE
53 yo F presents to ED c/o RLQ abd pain over hernia site.  Pt s/p prior  hysterectomy with resulting incisional hernia. Pt seen in ED yesterday and cleared by ACS after having labs and CT.   No assoc fever or chills.  Pt c/o decreased BM and decreased flatus.  On exam pt afebrile appears uncomfortable, Abd soft with R sided tenderness to palp, no rebound.  Repeat CT requested by Surgery with partial SBO.  ACS consult called

## 2019-09-11 ENCOUNTER — APPOINTMENT (OUTPATIENT)
Dept: SURGERY | Facility: CLINIC | Age: 54
End: 2019-09-11
Payer: MEDICAID

## 2019-09-11 ENCOUNTER — TRANSCRIPTION ENCOUNTER (OUTPATIENT)
Age: 54
End: 2019-09-11

## 2019-09-11 VITALS
SYSTOLIC BLOOD PRESSURE: 143 MMHG | BODY MASS INDEX: 27.14 KG/M2 | HEART RATE: 77 BPM | HEIGHT: 64 IN | TEMPERATURE: 98.2 F | WEIGHT: 159 LBS | OXYGEN SATURATION: 99 % | DIASTOLIC BLOOD PRESSURE: 87 MMHG

## 2019-09-11 PROCEDURE — 99214 OFFICE O/P EST MOD 30 MIN: CPT

## 2019-09-12 ENCOUNTER — RESULT REVIEW (OUTPATIENT)
Age: 54
End: 2019-09-12

## 2019-09-12 ENCOUNTER — OUTPATIENT (OUTPATIENT)
Dept: OUTPATIENT SERVICES | Facility: HOSPITAL | Age: 54
LOS: 1 days | Discharge: HOME | End: 2019-09-12
Payer: MEDICAID

## 2019-09-12 ENCOUNTER — APPOINTMENT (OUTPATIENT)
Dept: SURGERY | Facility: HOSPITAL | Age: 54
End: 2019-09-12

## 2019-09-12 VITALS
HEART RATE: 74 BPM | HEIGHT: 65 IN | WEIGHT: 158.95 LBS | SYSTOLIC BLOOD PRESSURE: 137 MMHG | DIASTOLIC BLOOD PRESSURE: 85 MMHG | OXYGEN SATURATION: 98 % | RESPIRATION RATE: 16 BRPM | TEMPERATURE: 98 F

## 2019-09-12 VITALS
DIASTOLIC BLOOD PRESSURE: 83 MMHG | SYSTOLIC BLOOD PRESSURE: 146 MMHG | RESPIRATION RATE: 15 BRPM | HEART RATE: 77 BPM | OXYGEN SATURATION: 97 %

## 2019-09-12 DIAGNOSIS — Z90.710 ACQUIRED ABSENCE OF BOTH CERVIX AND UTERUS: Chronic | ICD-10-CM

## 2019-09-12 DIAGNOSIS — Z98.89 OTHER SPECIFIED POSTPROCEDURAL STATES: Chronic | ICD-10-CM

## 2019-09-12 DIAGNOSIS — Z98.890 OTHER SPECIFIED POSTPROCEDURAL STATES: Chronic | ICD-10-CM

## 2019-09-12 DIAGNOSIS — Z01.818 ENCOUNTER FOR OTHER PREPROCEDURAL EXAMINATION: ICD-10-CM

## 2019-09-12 DIAGNOSIS — K46.9 UNSPECIFIED ABDOMINAL HERNIA WITHOUT OBSTRUCTION OR GANGRENE: ICD-10-CM

## 2019-09-12 PROCEDURE — 49566: CPT | Mod: AS

## 2019-09-12 PROCEDURE — 49568: CPT

## 2019-09-12 PROCEDURE — 88302 TISSUE EXAM BY PATHOLOGIST: CPT | Mod: 26

## 2019-09-12 PROCEDURE — 49568: CPT | Mod: AS

## 2019-09-12 PROCEDURE — 49566: CPT

## 2019-09-12 RX ORDER — SODIUM CHLORIDE 9 MG/ML
1000 INJECTION, SOLUTION INTRAVENOUS
Refills: 0 | Status: DISCONTINUED | OUTPATIENT
Start: 2019-09-12 | End: 2019-09-12

## 2019-09-12 RX ORDER — HYDROMORPHONE HYDROCHLORIDE 2 MG/ML
0.5 INJECTION INTRAMUSCULAR; INTRAVENOUS; SUBCUTANEOUS
Refills: 0 | Status: DISCONTINUED | OUTPATIENT
Start: 2019-09-12 | End: 2019-09-12

## 2019-09-12 RX ADMIN — HYDROMORPHONE HYDROCHLORIDE 0.5 MILLIGRAM(S): 2 INJECTION INTRAMUSCULAR; INTRAVENOUS; SUBCUTANEOUS at 14:52

## 2019-09-12 RX ADMIN — SODIUM CHLORIDE 75 MILLILITER(S): 9 INJECTION, SOLUTION INTRAVENOUS at 14:40

## 2019-09-12 RX ADMIN — HYDROMORPHONE HYDROCHLORIDE 0.5 MILLIGRAM(S): 2 INJECTION INTRAMUSCULAR; INTRAVENOUS; SUBCUTANEOUS at 14:37

## 2019-09-12 NOTE — H&P PST ADULT - NSICDXPASTSURGICALHX_GEN_ALL_CORE_FT
PAST SURGICAL HISTORY:  H/O hernia repair with mesh    H/O laparoscopy for infertility 2007    History of hysterectomy     S/P hysterectomy September 2016, with oophorectomy

## 2019-09-12 NOTE — H&P PST ADULT - NSICDXFAMILYHX_GEN_ALL_CORE_FT
FAMILY HISTORY:  Mother  Still living? No  Family history of hypertension in mother, Age at diagnosis: Age Unknown

## 2019-09-12 NOTE — H&P PST ADULT - HISTORY OF PRESENT ILLNESS
55 YO F with a sig PMHx of ovarian cancer (in remission 2016), s/p total hysterectomy, and Incisional hernia repair with mesh in 2017, now requiring admission for subsequent hernia repair. Patient states that she has discomfort due to the hernia.

## 2019-09-12 NOTE — ASU DISCHARGE PLAN (ADULT/PEDIATRIC) - ASU DC SPECIAL INSTRUCTIONSFT
ice packs on-off for 24hrs    f/u monday for drain removal, call for appointment     may take OTC meds for mild to mod pain

## 2019-09-12 NOTE — BRIEF OPERATIVE NOTE - OPERATION/FINDINGS
lower midline defect containing loop of small bowel, previously placed mesh fully incorporated     progrip onlay was placed

## 2019-09-12 NOTE — H&P PST ADULT - NSICDXPROBLEM_GEN_ALL_CORE_FT
PROBLEM DIAGNOSES  Problem: Recurrent hernia  Assessment and Plan: Repair hernia with mesh  Labs, and ECG within normal limits

## 2019-09-12 NOTE — H&P PST ADULT - ASSESSMENT
55 YO F with a sig PMHx of ovarian cancer (in remission 2016), s/p total hysterectomy, and Incisional hernia repair with mesh in 2017, now requiring admission for subsequent hernia repair.

## 2019-09-12 NOTE — ASU DISCHARGE PLAN (ADULT/PEDIATRIC) - CALL YOUR DOCTOR IF YOU HAVE ANY OF THE FOLLOWING:
Unable to urinate/Fever greater than (need to indicate Fahrenheit or Celsius)/Swelling that gets worse/Wound/Surgical Site with redness, or foul smelling discharge or pus/Bleeding that does not stop

## 2019-09-13 ENCOUNTER — APPOINTMENT (OUTPATIENT)
Dept: NUCLEAR MEDICINE | Facility: CLINIC | Age: 54
End: 2019-09-13

## 2019-09-16 ENCOUNTER — APPOINTMENT (OUTPATIENT)
Dept: SURGERY | Facility: CLINIC | Age: 54
End: 2019-09-16
Payer: MEDICAID

## 2019-09-16 VITALS
WEIGHT: 159 LBS | SYSTOLIC BLOOD PRESSURE: 159 MMHG | HEART RATE: 98 BPM | DIASTOLIC BLOOD PRESSURE: 93 MMHG | TEMPERATURE: 98.4 F | BODY MASS INDEX: 26.49 KG/M2 | HEIGHT: 65 IN

## 2019-09-16 PROCEDURE — 99024 POSTOP FOLLOW-UP VISIT: CPT

## 2019-09-16 NOTE — ASSESSMENT
[FreeTextEntry1] : Patient is doing well, feels good wearing abdominal binder. \par \par \par incision is c/d/i with staples. RAMAN with minimal serous drainage. drain removed.\par \par \par f/u 1 week for staple removal

## 2019-09-18 DIAGNOSIS — Z91.013 ALLERGY TO SEAFOOD: ICD-10-CM

## 2019-09-18 DIAGNOSIS — K43.0 INCISIONAL HERNIA WITH OBSTRUCTION, WITHOUT GANGRENE: ICD-10-CM

## 2019-09-18 DIAGNOSIS — Z90.710 ACQUIRED ABSENCE OF BOTH CERVIX AND UTERUS: ICD-10-CM

## 2019-09-18 DIAGNOSIS — K56.699 OTHER INTESTINAL OBSTRUCTION UNSPECIFIED AS TO PARTIAL VERSUS COMPLETE OBSTRUCTION: ICD-10-CM

## 2019-09-18 DIAGNOSIS — Z85.43 PERSONAL HISTORY OF MALIGNANT NEOPLASM OF OVARY: ICD-10-CM

## 2019-09-23 ENCOUNTER — OUTPATIENT (OUTPATIENT)
Dept: OUTPATIENT SERVICES | Facility: HOSPITAL | Age: 54
LOS: 1 days | Discharge: HOME | End: 2019-09-23

## 2019-09-23 ENCOUNTER — APPOINTMENT (OUTPATIENT)
Dept: SURGERY | Facility: CLINIC | Age: 54
End: 2019-09-23
Payer: MEDICAID

## 2019-09-23 VITALS
WEIGHT: 155 LBS | SYSTOLIC BLOOD PRESSURE: 144 MMHG | BODY MASS INDEX: 25.83 KG/M2 | HEART RATE: 73 BPM | DIASTOLIC BLOOD PRESSURE: 83 MMHG | HEIGHT: 65 IN

## 2019-09-23 DIAGNOSIS — Z90.710 ACQUIRED ABSENCE OF BOTH CERVIX AND UTERUS: Chronic | ICD-10-CM

## 2019-09-23 DIAGNOSIS — Z98.890 OTHER SPECIFIED POSTPROCEDURAL STATES: Chronic | ICD-10-CM

## 2019-09-23 DIAGNOSIS — C56.9 MALIGNANT NEOPLASM OF UNSPECIFIED OVARY: ICD-10-CM

## 2019-09-23 DIAGNOSIS — Z98.89 OTHER SPECIFIED POSTPROCEDURAL STATES: Chronic | ICD-10-CM

## 2019-09-23 PROCEDURE — 99024 POSTOP FOLLOW-UP VISIT: CPT

## 2019-09-23 NOTE — ASSESSMENT
[FreeTextEntry1] : patient is well, has no complaints\par \par \par incision is c/d/i and healing well. all staples removed.\par \par \par f/u 1 month\par \par \par

## 2019-09-24 ENCOUNTER — RESULT REVIEW (OUTPATIENT)
Age: 54
End: 2019-09-24

## 2019-09-24 ENCOUNTER — APPOINTMENT (OUTPATIENT)
Dept: HEMATOLOGY ONCOLOGY | Facility: CLINIC | Age: 54
End: 2019-09-24
Payer: MEDICAID

## 2019-09-24 VITALS
OXYGEN SATURATION: 97 % | RESPIRATION RATE: 16 BRPM | WEIGHT: 156.99 LBS | SYSTOLIC BLOOD PRESSURE: 154 MMHG | DIASTOLIC BLOOD PRESSURE: 89 MMHG | BODY MASS INDEX: 26.13 KG/M2 | HEART RATE: 77 BPM | TEMPERATURE: 97.9 F

## 2019-09-24 LAB
ALBUMIN SERPL ELPH-MCNC: 4.1 G/DL
ALP BLD-CCNC: 80 U/L
ALT SERPL-CCNC: 19 U/L
ANION GAP SERPL CALC-SCNC: 12 MMOL/L
AST SERPL-CCNC: 17 U/L
BILIRUB SERPL-MCNC: 0.3 MG/DL
BUN SERPL-MCNC: 15 MG/DL
CALCIUM SERPL-MCNC: 9.8 MG/DL
CANCER AG125 SERPL-ACNC: 13 U/ML
CHLORIDE SERPL-SCNC: 104 MMOL/L
CO2 SERPL-SCNC: 25 MMOL/L
CREAT SERPL-MCNC: 0.67 MG/DL
GLUCOSE SERPL-MCNC: 122 MG/DL
HCT VFR BLD CALC: 38 % — SIGNIFICANT CHANGE UP (ref 34.5–45)
HGB BLD-MCNC: 12.1 G/DL — SIGNIFICANT CHANGE UP (ref 11.5–15.5)
MCHC RBC-ENTMCNC: 28.2 PG — SIGNIFICANT CHANGE UP (ref 27–34)
MCHC RBC-ENTMCNC: 31.9 G/DL — LOW (ref 32–36)
MCV RBC AUTO: 88.5 FL — SIGNIFICANT CHANGE UP (ref 80–100)
PLATELET # BLD AUTO: 393 K/UL — SIGNIFICANT CHANGE UP (ref 150–400)
POTASSIUM SERPL-SCNC: 4.8 MMOL/L
PROT SERPL-MCNC: 7 G/DL
RBC # BLD: 4.3 M/UL — SIGNIFICANT CHANGE UP (ref 3.8–5.2)
RBC # FLD: 14.2 % — SIGNIFICANT CHANGE UP (ref 10.3–14.5)
SODIUM SERPL-SCNC: 141 MMOL/L
WBC # BLD: 10.5 K/UL — SIGNIFICANT CHANGE UP (ref 3.8–10.5)
WBC # FLD AUTO: 10.5 K/UL — SIGNIFICANT CHANGE UP (ref 3.8–10.5)

## 2019-09-24 PROCEDURE — 99214 OFFICE O/P EST MOD 30 MIN: CPT

## 2019-09-24 RX ORDER — AZITHROMYCIN 500 MG/1
500 TABLET, FILM COATED ORAL
Qty: 3 | Refills: 0 | Status: DISCONTINUED | COMMUNITY
Start: 2019-03-28 | End: 2019-09-24

## 2019-09-24 NOTE — HISTORY OF PRESENT ILLNESS
[Disease: _____________________] : Disease: [unfilled] [AJCC Stage: ____] : AJCC Stage: [unfilled] [de-identified] : 51-year-old female coming in for recommendation for new diagnosis of her cancer. Patient had a ferritin colonoscopy and she haven't have an ultrasound done that showed an adnexal cyst. She underwent surgical staging on September 12, 2060 and the final pathology showed stage IA, grade 2 ovarian cancer.\par Patient has recovered well from surgery. He currently is asymptomatic. [de-identified] : DOS- 9/12/16\par Genetic test:10/12/16 (49 gene panel) STK11 Variant, Unknown Significance: [id number].\par Carboplatin and Taxol 10/19/16 - 11/30/16 ( 3 cycles) [de-identified] : Patient is here for follow up, recently went to ED for abdominal pain and had surgery -incisional hernia repair on 9/12 with Dr. Castorena, pathology was negative for malignancy.  She is recovering well, had staples removed from incision yesterday.  Appetite was decreased but improving.  Denies fever,chills, chest pain, SOB, abdominal pain, nausea, vomiting, diarrhea, constipation, bleeding.

## 2019-10-01 ENCOUNTER — APPOINTMENT (OUTPATIENT)
Dept: GYNECOLOGIC ONCOLOGY | Facility: CLINIC | Age: 54
End: 2019-10-01
Payer: MEDICAID

## 2019-10-01 VITALS
WEIGHT: 159 LBS | HEIGHT: 65 IN | DIASTOLIC BLOOD PRESSURE: 82 MMHG | BODY MASS INDEX: 26.49 KG/M2 | SYSTOLIC BLOOD PRESSURE: 138 MMHG

## 2019-10-01 DIAGNOSIS — R18.8 OTHER ASCITES: ICD-10-CM

## 2019-10-01 DIAGNOSIS — R19.00 INTRA-ABDOMINAL AND PELVIC SWELLING, MASS AND LUMP, UNSPECIFIED SITE: ICD-10-CM

## 2019-10-01 DIAGNOSIS — S30.1XXA CONTUSION OF ABDOMINAL WALL, INITIAL ENCOUNTER: ICD-10-CM

## 2019-10-01 DIAGNOSIS — Z71.84 ENC FOR HEALTH COUNSELING RELATED TO TRAVEL: ICD-10-CM

## 2019-10-01 PROCEDURE — 99213 OFFICE O/P EST LOW 20 MIN: CPT

## 2019-10-21 ENCOUNTER — APPOINTMENT (OUTPATIENT)
Dept: SURGERY | Facility: CLINIC | Age: 54
End: 2019-10-21
Payer: MEDICAID

## 2019-10-21 VITALS
HEART RATE: 76 BPM | DIASTOLIC BLOOD PRESSURE: 94 MMHG | BODY MASS INDEX: 26.49 KG/M2 | HEIGHT: 65 IN | TEMPERATURE: 97.7 F | SYSTOLIC BLOOD PRESSURE: 144 MMHG | WEIGHT: 159 LBS

## 2019-10-21 PROCEDURE — 99024 POSTOP FOLLOW-UP VISIT: CPT

## 2019-10-21 NOTE — ASSESSMENT
[FreeTextEntry1] : patient is well, has no complaints at this time.\par \par \par incision is well healed.\par \par \par \par f/u prn

## 2019-12-26 ENCOUNTER — OUTPATIENT (OUTPATIENT)
Dept: OUTPATIENT SERVICES | Facility: HOSPITAL | Age: 54
LOS: 1 days | Discharge: HOME | End: 2019-12-26

## 2019-12-26 DIAGNOSIS — C56.9 MALIGNANT NEOPLASM OF UNSPECIFIED OVARY: ICD-10-CM

## 2019-12-26 DIAGNOSIS — Z90.710 ACQUIRED ABSENCE OF BOTH CERVIX AND UTERUS: Chronic | ICD-10-CM

## 2019-12-26 DIAGNOSIS — Z98.89 OTHER SPECIFIED POSTPROCEDURAL STATES: Chronic | ICD-10-CM

## 2019-12-26 DIAGNOSIS — Z98.890 OTHER SPECIFIED POSTPROCEDURAL STATES: Chronic | ICD-10-CM

## 2019-12-31 ENCOUNTER — RESULT REVIEW (OUTPATIENT)
Age: 54
End: 2019-12-31

## 2019-12-31 ENCOUNTER — APPOINTMENT (OUTPATIENT)
Dept: HEMATOLOGY ONCOLOGY | Facility: CLINIC | Age: 54
End: 2019-12-31
Payer: MEDICAID

## 2019-12-31 VITALS
WEIGHT: 157.93 LBS | DIASTOLIC BLOOD PRESSURE: 90 MMHG | TEMPERATURE: 98.4 F | SYSTOLIC BLOOD PRESSURE: 167 MMHG | HEART RATE: 79 BPM | OXYGEN SATURATION: 100 % | BODY MASS INDEX: 26.28 KG/M2 | RESPIRATION RATE: 18 BRPM

## 2019-12-31 DIAGNOSIS — K43.2 INCISIONAL HERNIA W/OUT OBSTRUCTION OR GANGRENE: ICD-10-CM

## 2019-12-31 LAB
ALBUMIN SERPL ELPH-MCNC: 4.1 G/DL
ALP BLD-CCNC: 86 U/L
ALT SERPL-CCNC: 27 U/L
ANION GAP SERPL CALC-SCNC: 9 MMOL/L
AST SERPL-CCNC: 20 U/L
BILIRUB SERPL-MCNC: 0.3 MG/DL
BUN SERPL-MCNC: 12 MG/DL
CALCIUM SERPL-MCNC: 9.4 MG/DL
CANCER AG125 SERPL-ACNC: 7 U/ML
CHLORIDE SERPL-SCNC: 103 MMOL/L
CO2 SERPL-SCNC: 27 MMOL/L
CREAT SERPL-MCNC: 0.53 MG/DL
GLUCOSE SERPL-MCNC: 122 MG/DL
HCT VFR BLD CALC: 37.8 % — SIGNIFICANT CHANGE UP (ref 34.5–45)
HGB BLD-MCNC: 12 G/DL — SIGNIFICANT CHANGE UP (ref 11.5–15.5)
MCHC RBC-ENTMCNC: 28.2 PG — SIGNIFICANT CHANGE UP (ref 27–34)
MCHC RBC-ENTMCNC: 31.7 G/DL — LOW (ref 32–36)
MCV RBC AUTO: 88.7 FL — SIGNIFICANT CHANGE UP (ref 80–100)
PLATELET # BLD AUTO: 273 K/UL — SIGNIFICANT CHANGE UP (ref 150–400)
POTASSIUM SERPL-SCNC: 4 MMOL/L
PROT SERPL-MCNC: 7.1 G/DL
RBC # BLD: 4.26 M/UL — SIGNIFICANT CHANGE UP (ref 3.8–5.2)
RBC # FLD: 12.9 % — SIGNIFICANT CHANGE UP (ref 10.3–14.5)
SODIUM SERPL-SCNC: 139 MMOL/L
WBC # BLD: 9 K/UL — SIGNIFICANT CHANGE UP (ref 3.8–10.5)
WBC # FLD AUTO: 9 K/UL — SIGNIFICANT CHANGE UP (ref 3.8–10.5)

## 2019-12-31 PROCEDURE — 99213 OFFICE O/P EST LOW 20 MIN: CPT

## 2020-01-02 PROBLEM — K43.2 HERNIA, INCISIONAL: Status: ACTIVE | Noted: 2019-09-24

## 2020-01-02 NOTE — HISTORY OF PRESENT ILLNESS
[Disease: _____________________] : Disease: [unfilled] [AJCC Stage: ____] : AJCC Stage: [unfilled] [de-identified] : DOS- 9/12/16\par Genetic test:10/12/16 (49 gene panel) STK11 Variant, Unknown Significance: [id number].\par Carboplatin and Taxol 10/19/16 - 11/30/16 ( 3 cycles) [de-identified] : 51-year-old female coming in for recommendation for new diagnosis of her cancer. Patient had a ferritin colonoscopy and she haven't have an ultrasound done that showed an adnexal cyst. She underwent surgical staging on September 12, 2060 and the final pathology showed stage IA, grade 2 ovarian cancer.\par Patient has recovered well from surgery. He currently is asymptomatic. [de-identified] : Patient is here for routine follow up.  No new complaints.\par Up to date with mammogram and colonoscopy.

## 2020-01-29 ENCOUNTER — APPOINTMENT (OUTPATIENT)
Dept: SURGERY | Facility: CLINIC | Age: 55
End: 2020-01-29
Payer: MEDICAID

## 2020-01-29 VITALS
TEMPERATURE: 98 F | HEIGHT: 65 IN | SYSTOLIC BLOOD PRESSURE: 184 MMHG | OXYGEN SATURATION: 100 % | HEART RATE: 88 BPM | DIASTOLIC BLOOD PRESSURE: 90 MMHG | BODY MASS INDEX: 26.55 KG/M2 | WEIGHT: 159.38 LBS

## 2020-01-29 PROCEDURE — 99211 OFF/OP EST MAY X REQ PHY/QHP: CPT

## 2020-01-29 NOTE — HISTORY OF PRESENT ILLNESS
[de-identified] : patient presents with c/o abdominal bloating and mild discomfort. She was worried about potential recurrent hernia. Patient states she is lactose intolerant and thinks bloating may be related to dairy intake. She denies any abdominal bulges.

## 2020-01-29 NOTE — ASSESSMENT
[FreeTextEntry1] : 53 yo female s/p repair of recurrent hernia who was concerned because of recent episode of bloating and pain which was likely related to dairy intake.\par \par patient will have f/u CT in 6 mos  for CA surveillance

## 2020-01-29 NOTE — PHYSICAL EXAM
[JVD] : no jugular venous distention  [Alert] : alert [Oriented to Person] : oriented to person [Oriented to Place] : oriented to place [Oriented to Time] : oriented to time [Calm] : calm [de-identified] : HARRY COLON NAD [de-identified] : EOMI [de-identified] : soft NT ND no evidence of  recurrent hernia,

## 2020-04-07 ENCOUNTER — APPOINTMENT (OUTPATIENT)
Dept: GYNECOLOGIC ONCOLOGY | Facility: CLINIC | Age: 55
End: 2020-04-07

## 2020-06-24 ENCOUNTER — OUTPATIENT (OUTPATIENT)
Dept: OUTPATIENT SERVICES | Facility: HOSPITAL | Age: 55
LOS: 1 days | Discharge: HOME | End: 2020-06-24

## 2020-06-24 DIAGNOSIS — Z90.710 ACQUIRED ABSENCE OF BOTH CERVIX AND UTERUS: Chronic | ICD-10-CM

## 2020-06-24 DIAGNOSIS — Z98.890 OTHER SPECIFIED POSTPROCEDURAL STATES: Chronic | ICD-10-CM

## 2020-06-24 DIAGNOSIS — C56.9 MALIGNANT NEOPLASM OF UNSPECIFIED OVARY: ICD-10-CM

## 2020-06-24 DIAGNOSIS — Z98.89 OTHER SPECIFIED POSTPROCEDURAL STATES: Chronic | ICD-10-CM

## 2020-06-30 ENCOUNTER — APPOINTMENT (OUTPATIENT)
Dept: HEMATOLOGY ONCOLOGY | Facility: CLINIC | Age: 55
End: 2020-06-30
Payer: MEDICAID

## 2020-06-30 PROCEDURE — 99213 OFFICE O/P EST LOW 20 MIN: CPT | Mod: 95

## 2020-07-02 NOTE — CONSULT NOTE ADULT - CONSULT REQUESTED DATE/TIME
Abbie Mckee received a viral test for COVID-19. They were educated on isolation and quarantine as appropriate. For any symptoms, they were directed to seek care from their PCP, given contact information to establish with a doctor, directed to an urgent care or the emergency room.
10-Sep-2019

## 2020-07-02 NOTE — HISTORY OF PRESENT ILLNESS
[Home] : at home, [unfilled] , at the time of the visit. [Medical Office: ([facility])___] : at the medical office located in  [Verbal consent obtained from patient] : the patient, [unfilled] [Disease: _____________________] : Disease: [unfilled] [AJCC Stage: ____] : AJCC Stage: [unfilled] [de-identified] : 51-year-old female coming in for recommendation for new diagnosis of her cancer. Patient had a ferritin colonoscopy and she haven't have an ultrasound done that showed an adnexal cyst. She underwent surgical staging on September 12, 2060 and the final pathology showed stage IA, grade 2 ovarian cancer.\par Patient has recovered well from surgery. He currently is asymptomatic. 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  She is doing well and has no new complaints.  She is due for mammogram, up to date with colonoscopy  (2016).  She notes some abdominal cramping when eating dairy but has always had issues with dairy intolerance.  Denies fever, chills, chest pain, SOB, abdominal pain, nausea, vomiting, bloating, diarrhea, constipation.

## 2020-07-02 NOTE — ASSESSMENT
[Curative] : Goals of care discussed with patient: Curative [Palliative Care Plan] : not applicable at this time [FreeTextEntry1] : 55 year old woman with ovarian cancer currently in CR.\par Will gets labs when she is here for mammogram.\par Reviewed signs and symptoms of recurrence.\par Abdominal cramping - advised to avoid dairy and reassess.\par RTC 6 months.

## 2020-07-16 ENCOUNTER — RESULT REVIEW (OUTPATIENT)
Age: 55
End: 2020-07-16

## 2020-07-16 ENCOUNTER — APPOINTMENT (OUTPATIENT)
Dept: HEMATOLOGY ONCOLOGY | Facility: CLINIC | Age: 55
End: 2020-07-16

## 2020-07-16 LAB
BASOPHILS # BLD AUTO: 0.04 K/UL — SIGNIFICANT CHANGE UP (ref 0–0.2)
BASOPHILS NFR BLD AUTO: 0.5 % — SIGNIFICANT CHANGE UP (ref 0–2)
EOSINOPHIL # BLD AUTO: 0.14 K/UL — SIGNIFICANT CHANGE UP (ref 0–0.5)
EOSINOPHIL NFR BLD AUTO: 1.6 % — SIGNIFICANT CHANGE UP (ref 0–6)
HCT VFR BLD CALC: 37.3 % — SIGNIFICANT CHANGE UP (ref 34.5–45)
HGB BLD-MCNC: 12.2 G/DL — SIGNIFICANT CHANGE UP (ref 11.5–15.5)
IMM GRANULOCYTES NFR BLD AUTO: 0.5 % — SIGNIFICANT CHANGE UP (ref 0–1.5)
LYMPHOCYTES # BLD AUTO: 2.24 K/UL — SIGNIFICANT CHANGE UP (ref 1–3.3)
LYMPHOCYTES # BLD AUTO: 25.4 % — SIGNIFICANT CHANGE UP (ref 13–44)
MCHC RBC-ENTMCNC: 28.4 PG — SIGNIFICANT CHANGE UP (ref 27–34)
MCHC RBC-ENTMCNC: 32.7 GM/DL — SIGNIFICANT CHANGE UP (ref 32–36)
MCV RBC AUTO: 86.9 FL — SIGNIFICANT CHANGE UP (ref 80–100)
MONOCYTES # BLD AUTO: 0.54 K/UL — SIGNIFICANT CHANGE UP (ref 0–0.9)
MONOCYTES NFR BLD AUTO: 6.1 % — SIGNIFICANT CHANGE UP (ref 2–14)
NEUTROPHILS # BLD AUTO: 5.83 K/UL — SIGNIFICANT CHANGE UP (ref 1.8–7.4)
NEUTROPHILS NFR BLD AUTO: 65.9 % — SIGNIFICANT CHANGE UP (ref 43–77)
NRBC # BLD: 0 /100 WBCS — SIGNIFICANT CHANGE UP (ref 0–0)
PLATELET # BLD AUTO: 280 K/UL — SIGNIFICANT CHANGE UP (ref 150–400)
RBC # BLD: 4.29 M/UL — SIGNIFICANT CHANGE UP (ref 3.8–5.2)
RBC # FLD: 13.9 % — SIGNIFICANT CHANGE UP (ref 10.3–14.5)
WBC # BLD: 8.83 K/UL — SIGNIFICANT CHANGE UP (ref 3.8–10.5)
WBC # FLD AUTO: 8.83 K/UL — SIGNIFICANT CHANGE UP (ref 3.8–10.5)

## 2020-07-22 LAB
ALBUMIN SERPL ELPH-MCNC: 4.3 G/DL
ALP BLD-CCNC: 73 U/L
ALT SERPL-CCNC: 23 U/L
ANION GAP SERPL CALC-SCNC: 13 MMOL/L
AST SERPL-CCNC: 22 U/L
BILIRUB SERPL-MCNC: 0.4 MG/DL
BUN SERPL-MCNC: 11 MG/DL
CALCIUM SERPL-MCNC: 9.7 MG/DL
CANCER AG125 SERPL-ACNC: 6 U/ML
CHLORIDE SERPL-SCNC: 103 MMOL/L
CO2 SERPL-SCNC: 24 MMOL/L
CREAT SERPL-MCNC: 0.59 MG/DL
GLUCOSE SERPL-MCNC: 127 MG/DL
POTASSIUM SERPL-SCNC: 4.1 MMOL/L
PROT SERPL-MCNC: 7.2 G/DL
SODIUM SERPL-SCNC: 140 MMOL/L

## 2020-08-11 ENCOUNTER — APPOINTMENT (OUTPATIENT)
Dept: GYNECOLOGIC ONCOLOGY | Facility: CLINIC | Age: 55
End: 2020-08-11
Payer: MEDICAID

## 2020-08-11 VITALS
SYSTOLIC BLOOD PRESSURE: 122 MMHG | HEIGHT: 65 IN | BODY MASS INDEX: 27.32 KG/M2 | WEIGHT: 164 LBS | DIASTOLIC BLOOD PRESSURE: 86 MMHG

## 2020-08-11 DIAGNOSIS — Z09 ENCOUNTER FOR FOLLOW-UP EXAMINATION AFTER COMPLETED TREATMENT FOR CONDITIONS OTHER THAN MALIGNANT NEOPLASM: ICD-10-CM

## 2020-08-11 PROCEDURE — 99213 OFFICE O/P EST LOW 20 MIN: CPT

## 2020-08-11 NOTE — HISTORY OF PRESENT ILLNESS
[FreeTextEntry1] : Ms. 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 She received 3 cycles of adjuvant chemotherapy with IV carbo/taxol, completing cycle #3 on 11/30/16. She tolerated treatment well without toxicity.\par She returns today for a surveillance visit. 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 Castorena on 5/16/17. She had a small postop seroma seen on scans. This enlarged and eventually she required repair of an incarcerated hernia on 9/12/19. \par \par Markers: \par at diagnosis: 204 ( was 347)\par 12/14/16 = 10\par 3/29/17 = 11, CEA = 1.5\par 8/8/17 = 11\par 10/31/17 = 12\par 3/12/18 = 9\par 9/12/18 = 11\par 3/26/19 = 8\par 9/24/19 = 13\par 7/16/20 = 6\par \par Imaging:\par 12/17/16 CT A/P: slight increase in size of nodes (left ext iliac, left femoral, subcm on right) and unchanged right peritoneal nodularity. \par 1/8/17 PET/CT: stable non-FDG avid nodes\par 4/6/17 CT A/P: PARVEZ; new periumbilical hernias containing nonobstructed small bowel.\par 10/15/17 CT A/P: PARVEZ\par 4/7/18 CT A/P: PARVEZ. decreased size of supraumbilical postop seroma (from hernia repair). new infraumbilical abd wall fluid collection that was minimal on 10/2017 scan, unexplained. \par 6/26/18 CT A/P: interval increase in size of right paramedian fluid collection, 6.9 x 3.9cm; stable supraumbilical fluid collection. Otherwise normal scan, PARVEZ. \par 7/5/18 Attempted FNA under sono guidance- high viscosity of fluid limited the aspiration; Cytology: a reactive process is favored. \par 1/2/19 CT A/P: PARVEZ; Slight interval increase in size of fluid collection in anterior abd wall hernia repair site, fluid is now more complex;  question of possible early hernia recurrence - she saw Dr. Castorena and on his exam there is no evidence of hernia recurrence, she will return to see him PRN. \par 1/22/19 Repeat US-guided core biopsy and FNA - BENIGN.\par 8/17/19 CT A/P: increase in size of hernia and fluid collection, now containing unobstructed bowel. otherwise PARVEZ. \par \par She denies any periumbilical pain or bulge, pelvic pain, VB or abnormal discharge or any other associated signs or symptoms. \par \par HM:\par PAP: not applicable\par Mammo: 5/2019 BIRADS2; has appt 8/2020\par Colonoscopy: 8/2/16 normal; no upper endoscopy\par BMD: ordered by Dr. Jarrett\par \par Regular GYN: Dr. Keith Ruby\par PCP/referring: Dr. Shelby Cervantes\par Med Onc: Dr. Jarrett\par GI: Dr. Cullen \par General surgery: Dr. Castorena

## 2020-08-11 NOTE — PHYSICAL EXAM
[Absent] : Uterus: Absent [Normal] : Recto-Vaginal Exam: Normal [de-identified] : well-healed [de-identified] : s/p hernia repair. wellhealed, NT, no mass [de-identified] : well healed cuff, no lesions [de-identified] : no mass or tenderness

## 2020-08-11 NOTE — LETTER BODY
[Dear  ___] : Dear  [unfilled], [Attached please find my note.] : Attached please find my note. [I recently saw our patient [unfilled] for a follow-up visit.] : I recently saw our patient, [unfilled] for a follow-up visit. [FreeTextEntry2] : She completed adjuvant chemotherapy with Dr. Jarrett in late November 2016 and is currently clinically without evidence of disease. She will see me in 6 months for surveillance exam. I will keep you updated on her progress. Thank you again for referring this mg patient.

## 2020-08-31 ENCOUNTER — APPOINTMENT (OUTPATIENT)
Dept: ULTRASOUND IMAGING | Facility: IMAGING CENTER | Age: 55
End: 2020-08-31
Payer: MEDICAID

## 2020-08-31 ENCOUNTER — APPOINTMENT (OUTPATIENT)
Dept: MAMMOGRAPHY | Facility: IMAGING CENTER | Age: 55
End: 2020-08-31
Payer: MEDICAID

## 2020-08-31 ENCOUNTER — RESULT REVIEW (OUTPATIENT)
Age: 55
End: 2020-08-31

## 2020-08-31 ENCOUNTER — OUTPATIENT (OUTPATIENT)
Dept: OUTPATIENT SERVICES | Facility: HOSPITAL | Age: 55
LOS: 1 days | End: 2020-08-31
Payer: MEDICAID

## 2020-08-31 DIAGNOSIS — Z90.710 ACQUIRED ABSENCE OF BOTH CERVIX AND UTERUS: Chronic | ICD-10-CM

## 2020-08-31 DIAGNOSIS — Z98.89 OTHER SPECIFIED POSTPROCEDURAL STATES: Chronic | ICD-10-CM

## 2020-08-31 DIAGNOSIS — Z98.890 OTHER SPECIFIED POSTPROCEDURAL STATES: Chronic | ICD-10-CM

## 2020-08-31 DIAGNOSIS — C56.9 MALIGNANT NEOPLASM OF UNSPECIFIED OVARY: ICD-10-CM

## 2020-08-31 PROCEDURE — 77063 BREAST TOMOSYNTHESIS BI: CPT | Mod: 26

## 2020-08-31 PROCEDURE — 77067 SCR MAMMO BI INCL CAD: CPT

## 2020-08-31 PROCEDURE — 77067 SCR MAMMO BI INCL CAD: CPT | Mod: 26

## 2020-08-31 PROCEDURE — 77063 BREAST TOMOSYNTHESIS BI: CPT

## 2021-02-09 ENCOUNTER — APPOINTMENT (OUTPATIENT)
Dept: GYNECOLOGIC ONCOLOGY | Facility: CLINIC | Age: 56
End: 2021-02-09
Payer: MEDICAID

## 2021-02-09 VITALS
BODY MASS INDEX: 26.33 KG/M2 | DIASTOLIC BLOOD PRESSURE: 95 MMHG | HEIGHT: 65 IN | WEIGHT: 158 LBS | SYSTOLIC BLOOD PRESSURE: 161 MMHG | HEART RATE: 78 BPM

## 2021-02-09 PROCEDURE — 99213 OFFICE O/P EST LOW 20 MIN: CPT

## 2021-02-09 PROCEDURE — 99072 ADDL SUPL MATRL&STAF TM PHE: CPT

## 2021-03-16 ENCOUNTER — OUTPATIENT (OUTPATIENT)
Dept: OUTPATIENT SERVICES | Facility: HOSPITAL | Age: 56
LOS: 1 days | Discharge: HOME | End: 2021-03-16

## 2021-03-16 DIAGNOSIS — Z90.710 ACQUIRED ABSENCE OF BOTH CERVIX AND UTERUS: Chronic | ICD-10-CM

## 2021-03-16 DIAGNOSIS — Z98.890 OTHER SPECIFIED POSTPROCEDURAL STATES: Chronic | ICD-10-CM

## 2021-03-16 DIAGNOSIS — Z98.89 OTHER SPECIFIED POSTPROCEDURAL STATES: Chronic | ICD-10-CM

## 2021-03-16 DIAGNOSIS — C56.9 MALIGNANT NEOPLASM OF UNSPECIFIED OVARY: ICD-10-CM

## 2021-03-19 ENCOUNTER — RESULT REVIEW (OUTPATIENT)
Age: 56
End: 2021-03-19

## 2021-03-19 ENCOUNTER — APPOINTMENT (OUTPATIENT)
Dept: HEMATOLOGY ONCOLOGY | Facility: CLINIC | Age: 56
End: 2021-03-19
Payer: MEDICAID

## 2021-03-19 VITALS
OXYGEN SATURATION: 100 % | DIASTOLIC BLOOD PRESSURE: 91 MMHG | SYSTOLIC BLOOD PRESSURE: 161 MMHG | BODY MASS INDEX: 27 KG/M2 | RESPIRATION RATE: 17 BRPM | WEIGHT: 162.04 LBS | HEIGHT: 65 IN | TEMPERATURE: 97.8 F | HEART RATE: 69 BPM

## 2021-03-19 LAB
ALBUMIN SERPL ELPH-MCNC: 4.3 G/DL
ALP BLD-CCNC: 93 U/L
ALT SERPL-CCNC: 25 U/L
ANION GAP SERPL CALC-SCNC: 10 MMOL/L
AST SERPL-CCNC: 24 U/L
BASOPHILS # BLD AUTO: 0.05 K/UL — SIGNIFICANT CHANGE UP (ref 0–0.2)
BASOPHILS NFR BLD AUTO: 0.6 % — SIGNIFICANT CHANGE UP (ref 0–2)
BILIRUB SERPL-MCNC: 0.3 MG/DL
BUN SERPL-MCNC: 14 MG/DL
CALCIUM SERPL-MCNC: 10.2 MG/DL
CANCER AG125 SERPL-ACNC: 6 U/ML
CHLORIDE SERPL-SCNC: 104 MMOL/L
CO2 SERPL-SCNC: 25 MMOL/L
CREAT SERPL-MCNC: 0.6 MG/DL
EOSINOPHIL # BLD AUTO: 0.16 K/UL — SIGNIFICANT CHANGE UP (ref 0–0.5)
EOSINOPHIL NFR BLD AUTO: 1.9 % — SIGNIFICANT CHANGE UP (ref 0–6)
GLUCOSE SERPL-MCNC: 122 MG/DL
HCT VFR BLD CALC: 39.6 % — SIGNIFICANT CHANGE UP (ref 34.5–45)
HGB BLD-MCNC: 12.8 G/DL — SIGNIFICANT CHANGE UP (ref 11.5–15.5)
IMM GRANULOCYTES NFR BLD AUTO: 0.2 % — SIGNIFICANT CHANGE UP (ref 0–1.5)
LYMPHOCYTES # BLD AUTO: 2.2 K/UL — SIGNIFICANT CHANGE UP (ref 1–3.3)
LYMPHOCYTES # BLD AUTO: 26.6 % — SIGNIFICANT CHANGE UP (ref 13–44)
MCHC RBC-ENTMCNC: 28.3 PG — SIGNIFICANT CHANGE UP (ref 27–34)
MCHC RBC-ENTMCNC: 32.3 G/DL — SIGNIFICANT CHANGE UP (ref 32–36)
MCV RBC AUTO: 87.6 FL — SIGNIFICANT CHANGE UP (ref 80–100)
MONOCYTES # BLD AUTO: 0.58 K/UL — SIGNIFICANT CHANGE UP (ref 0–0.9)
MONOCYTES NFR BLD AUTO: 7 % — SIGNIFICANT CHANGE UP (ref 2–14)
NEUTROPHILS # BLD AUTO: 5.25 K/UL — SIGNIFICANT CHANGE UP (ref 1.8–7.4)
NEUTROPHILS NFR BLD AUTO: 63.7 % — SIGNIFICANT CHANGE UP (ref 43–77)
NRBC # BLD: 0 /100 WBCS — SIGNIFICANT CHANGE UP (ref 0–0)
PLATELET # BLD AUTO: 296 K/UL — SIGNIFICANT CHANGE UP (ref 150–400)
POTASSIUM SERPL-SCNC: 4.7 MMOL/L
PROT SERPL-MCNC: 7.2 G/DL
RBC # BLD: 4.52 M/UL — SIGNIFICANT CHANGE UP (ref 3.8–5.2)
RBC # FLD: 13.6 % — SIGNIFICANT CHANGE UP (ref 10.3–14.5)
SODIUM SERPL-SCNC: 139 MMOL/L
WBC # BLD: 8.26 K/UL — SIGNIFICANT CHANGE UP (ref 3.8–10.5)
WBC # FLD AUTO: 8.26 K/UL — SIGNIFICANT CHANGE UP (ref 3.8–10.5)

## 2021-03-19 PROCEDURE — 99213 OFFICE O/P EST LOW 20 MIN: CPT

## 2021-03-19 PROCEDURE — 99072 ADDL SUPL MATRL&STAF TM PHE: CPT

## 2021-03-21 NOTE — HISTORY OF PRESENT ILLNESS
[Disease: _____________________] : Disease: [unfilled] [AJCC Stage: ____] : AJCC Stage: [unfilled] [de-identified] : 51-year-old female coming in for recommendation for new diagnosis of her cancer. Patient had a ferritin colonoscopy and she haven't have an ultrasound done that showed an adnexal cyst. She underwent surgical staging on September 12, 2060 and the final pathology showed stage IA, grade 2 ovarian cancer.\par Patient has recovered well from surgery. He currently is asymptomatic. [de-identified] : DOS- 9/12/16\par Genetic test:10/12/16 (49 gene panel) STK11 Variant, Unknown Significance: p.[id number].\par Carboplatin and Taxol 10/19/16 - 11/30/16 ( 3 cycles) [de-identified] : Patient feels well.\par She has no bowel or bladder issues.\par She is up to date with mammogram and colonoscopy.

## 2021-03-24 ENCOUNTER — APPOINTMENT (OUTPATIENT)
Dept: SURGERY | Facility: CLINIC | Age: 56
End: 2021-03-24
Payer: MEDICAID

## 2021-03-24 VITALS
BODY MASS INDEX: 26.99 KG/M2 | WEIGHT: 162 LBS | DIASTOLIC BLOOD PRESSURE: 95 MMHG | HEIGHT: 65 IN | SYSTOLIC BLOOD PRESSURE: 166 MMHG | HEART RATE: 77 BPM | TEMPERATURE: 96.8 F | OXYGEN SATURATION: 99 %

## 2021-03-24 PROCEDURE — 99072 ADDL SUPL MATRL&STAF TM PHE: CPT

## 2021-03-24 PROCEDURE — 99214 OFFICE O/P EST MOD 30 MIN: CPT

## 2021-08-24 ENCOUNTER — APPOINTMENT (OUTPATIENT)
Dept: GYNECOLOGIC ONCOLOGY | Facility: CLINIC | Age: 56
End: 2021-08-24
Payer: MEDICAID

## 2021-08-24 VITALS
HEIGHT: 65 IN | DIASTOLIC BLOOD PRESSURE: 92 MMHG | HEART RATE: 81 BPM | WEIGHT: 160 LBS | BODY MASS INDEX: 26.66 KG/M2 | SYSTOLIC BLOOD PRESSURE: 160 MMHG

## 2021-08-24 PROCEDURE — 99213 OFFICE O/P EST LOW 20 MIN: CPT

## 2021-09-16 ENCOUNTER — OUTPATIENT (OUTPATIENT)
Dept: OUTPATIENT SERVICES | Facility: HOSPITAL | Age: 56
LOS: 1 days | Discharge: HOME | End: 2021-09-16

## 2021-09-16 DIAGNOSIS — Z98.890 OTHER SPECIFIED POSTPROCEDURAL STATES: Chronic | ICD-10-CM

## 2021-09-16 DIAGNOSIS — Z90.710 ACQUIRED ABSENCE OF BOTH CERVIX AND UTERUS: Chronic | ICD-10-CM

## 2021-09-16 DIAGNOSIS — C56.9 MALIGNANT NEOPLASM OF UNSPECIFIED OVARY: ICD-10-CM

## 2021-09-16 DIAGNOSIS — Z98.89 OTHER SPECIFIED POSTPROCEDURAL STATES: Chronic | ICD-10-CM

## 2021-09-17 ENCOUNTER — RESULT REVIEW (OUTPATIENT)
Age: 56
End: 2021-09-17

## 2021-09-17 ENCOUNTER — APPOINTMENT (OUTPATIENT)
Dept: MAMMOGRAPHY | Facility: IMAGING CENTER | Age: 56
End: 2021-09-17
Payer: MEDICAID

## 2021-09-17 ENCOUNTER — OUTPATIENT (OUTPATIENT)
Dept: OUTPATIENT SERVICES | Facility: HOSPITAL | Age: 56
LOS: 1 days | End: 2021-09-17
Payer: MEDICAID

## 2021-09-17 ENCOUNTER — APPOINTMENT (OUTPATIENT)
Dept: HEMATOLOGY ONCOLOGY | Facility: CLINIC | Age: 56
End: 2021-09-17
Payer: MEDICAID

## 2021-09-17 VITALS
HEART RATE: 84 BPM | DIASTOLIC BLOOD PRESSURE: 94 MMHG | BODY MASS INDEX: 26.05 KG/M2 | OXYGEN SATURATION: 98 % | TEMPERATURE: 97.9 F | RESPIRATION RATE: 16 BRPM | SYSTOLIC BLOOD PRESSURE: 156 MMHG | WEIGHT: 156.53 LBS

## 2021-09-17 DIAGNOSIS — Z98.890 OTHER SPECIFIED POSTPROCEDURAL STATES: Chronic | ICD-10-CM

## 2021-09-17 DIAGNOSIS — Z90.710 ACQUIRED ABSENCE OF BOTH CERVIX AND UTERUS: Chronic | ICD-10-CM

## 2021-09-17 DIAGNOSIS — Z00.8 ENCOUNTER FOR OTHER GENERAL EXAMINATION: ICD-10-CM

## 2021-09-17 DIAGNOSIS — Z98.89 OTHER SPECIFIED POSTPROCEDURAL STATES: Chronic | ICD-10-CM

## 2021-09-17 LAB
ALBUMIN SERPL ELPH-MCNC: 4.5 G/DL
ALP BLD-CCNC: 92 U/L
ALT SERPL-CCNC: 25 U/L
ANION GAP SERPL CALC-SCNC: 14 MMOL/L
AST SERPL-CCNC: 17 U/L
BASOPHILS # BLD AUTO: 0.05 K/UL — SIGNIFICANT CHANGE UP (ref 0–0.2)
BASOPHILS NFR BLD AUTO: 0.6 % — SIGNIFICANT CHANGE UP (ref 0–2)
BILIRUB SERPL-MCNC: 0.3 MG/DL
BUN SERPL-MCNC: 19 MG/DL
CALCIUM SERPL-MCNC: 10.3 MG/DL
CANCER AG125 SERPL-ACNC: 9 U/ML
CHLORIDE SERPL-SCNC: 102 MMOL/L
CO2 SERPL-SCNC: 23 MMOL/L
CREAT SERPL-MCNC: 0.63 MG/DL
EOSINOPHIL # BLD AUTO: 0.27 K/UL — SIGNIFICANT CHANGE UP (ref 0–0.5)
EOSINOPHIL NFR BLD AUTO: 3 % — SIGNIFICANT CHANGE UP (ref 0–6)
GLUCOSE SERPL-MCNC: 142 MG/DL
HCT VFR BLD CALC: 41.1 % — SIGNIFICANT CHANGE UP (ref 34.5–45)
HGB BLD-MCNC: 13.2 G/DL — SIGNIFICANT CHANGE UP (ref 11.5–15.5)
IMM GRANULOCYTES NFR BLD AUTO: 0.6 % — SIGNIFICANT CHANGE UP (ref 0–1.5)
LYMPHOCYTES # BLD AUTO: 2.13 K/UL — SIGNIFICANT CHANGE UP (ref 1–3.3)
LYMPHOCYTES # BLD AUTO: 23.6 % — SIGNIFICANT CHANGE UP (ref 13–44)
MCHC RBC-ENTMCNC: 28.1 PG — SIGNIFICANT CHANGE UP (ref 27–34)
MCHC RBC-ENTMCNC: 32.1 G/DL — SIGNIFICANT CHANGE UP (ref 32–36)
MCV RBC AUTO: 87.6 FL — SIGNIFICANT CHANGE UP (ref 80–100)
MONOCYTES # BLD AUTO: 0.56 K/UL — SIGNIFICANT CHANGE UP (ref 0–0.9)
MONOCYTES NFR BLD AUTO: 6.2 % — SIGNIFICANT CHANGE UP (ref 2–14)
NEUTROPHILS # BLD AUTO: 5.95 K/UL — SIGNIFICANT CHANGE UP (ref 1.8–7.4)
NEUTROPHILS NFR BLD AUTO: 66 % — SIGNIFICANT CHANGE UP (ref 43–77)
NRBC # BLD: 0 /100 WBCS — SIGNIFICANT CHANGE UP (ref 0–0)
PLATELET # BLD AUTO: 278 K/UL — SIGNIFICANT CHANGE UP (ref 150–400)
POTASSIUM SERPL-SCNC: 4.6 MMOL/L
PROT SERPL-MCNC: 7.8 G/DL
RBC # BLD: 4.69 M/UL — SIGNIFICANT CHANGE UP (ref 3.8–5.2)
RBC # FLD: 13.7 % — SIGNIFICANT CHANGE UP (ref 10.3–14.5)
SODIUM SERPL-SCNC: 139 MMOL/L
WBC # BLD: 9.01 K/UL — SIGNIFICANT CHANGE UP (ref 3.8–10.5)
WBC # FLD AUTO: 9.01 K/UL — SIGNIFICANT CHANGE UP (ref 3.8–10.5)

## 2021-09-17 PROCEDURE — 77067 SCR MAMMO BI INCL CAD: CPT

## 2021-09-17 PROCEDURE — 77063 BREAST TOMOSYNTHESIS BI: CPT | Mod: 26

## 2021-09-17 PROCEDURE — 99213 OFFICE O/P EST LOW 20 MIN: CPT

## 2021-09-17 PROCEDURE — 77063 BREAST TOMOSYNTHESIS BI: CPT

## 2021-09-17 PROCEDURE — 77067 SCR MAMMO BI INCL CAD: CPT | Mod: 26

## 2021-09-20 NOTE — HISTORY OF PRESENT ILLNESS
[Disease: _____________________] : Disease: [unfilled] [AJCC Stage: ____] : AJCC Stage: [unfilled] [de-identified] : 51-year-old female coming in for recommendation for new diagnosis of her cancer. Patient had a ferritin colonoscopy and she haven't have an ultrasound done that showed an adnexal cyst. She underwent surgical staging on September 12, 2060 and the final pathology showed stage IA, grade 2 ovarian cancer.\par Patient has recovered well from surgery. He currently is asymptomatic. [de-identified] : DOS- 9/12/16\par Genetic test:10/12/16 (49 gene panel) STK11 Variant, Unknown Significance: p.[id number].\par Carboplatin and Taxol 10/19/16 - 11/30/16 ( 3 cycles) [de-identified] : Patient here for a f/u visit.\par She feels well, has no bowel or bladder issues.\par Appetite is normal.

## 2021-10-20 ENCOUNTER — APPOINTMENT (OUTPATIENT)
Dept: HEMATOLOGY ONCOLOGY | Facility: CLINIC | Age: 56
End: 2021-10-20

## 2022-01-26 PROBLEM — Z08 ENCOUNTER FOR FOLLOW-UP SURVEILLANCE OF OVARIAN CANCER: Status: ACTIVE | Noted: 2022-01-26

## 2022-02-01 ENCOUNTER — APPOINTMENT (OUTPATIENT)
Dept: GYNECOLOGIC ONCOLOGY | Facility: CLINIC | Age: 57
End: 2022-02-01
Payer: MEDICAID

## 2022-02-01 VITALS
BODY MASS INDEX: 26.33 KG/M2 | WEIGHT: 158 LBS | SYSTOLIC BLOOD PRESSURE: 138 MMHG | DIASTOLIC BLOOD PRESSURE: 78 MMHG | HEART RATE: 77 BPM | HEIGHT: 65 IN

## 2022-02-01 DIAGNOSIS — Z08 ENCOUNTER FOR FOLLOW-UP EXAMINATION AFTER COMPLETED TREATMENT FOR MALIGNANT NEOPLASM: ICD-10-CM

## 2022-02-01 DIAGNOSIS — Z85.43 ENCOUNTER FOR FOLLOW-UP EXAMINATION AFTER COMPLETED TREATMENT FOR MALIGNANT NEOPLASM: ICD-10-CM

## 2022-02-01 PROCEDURE — 99213 OFFICE O/P EST LOW 20 MIN: CPT

## 2022-08-02 ENCOUNTER — APPOINTMENT (OUTPATIENT)
Dept: GYNECOLOGIC ONCOLOGY | Facility: CLINIC | Age: 57
End: 2022-08-02

## 2022-08-02 VITALS
HEART RATE: 75 BPM | DIASTOLIC BLOOD PRESSURE: 84 MMHG | SYSTOLIC BLOOD PRESSURE: 164 MMHG | BODY MASS INDEX: 25.66 KG/M2 | HEIGHT: 65 IN | WEIGHT: 154 LBS

## 2022-08-02 PROCEDURE — 99213 OFFICE O/P EST LOW 20 MIN: CPT

## 2022-09-26 ENCOUNTER — OUTPATIENT (OUTPATIENT)
Dept: OUTPATIENT SERVICES | Facility: HOSPITAL | Age: 57
LOS: 1 days | Discharge: HOME | End: 2022-09-26

## 2022-09-26 DIAGNOSIS — Z98.89 OTHER SPECIFIED POSTPROCEDURAL STATES: Chronic | ICD-10-CM

## 2022-09-26 DIAGNOSIS — Z90.710 ACQUIRED ABSENCE OF BOTH CERVIX AND UTERUS: Chronic | ICD-10-CM

## 2022-09-26 DIAGNOSIS — Z98.890 OTHER SPECIFIED POSTPROCEDURAL STATES: Chronic | ICD-10-CM

## 2022-09-26 DIAGNOSIS — C56.9 MALIGNANT NEOPLASM OF UNSPECIFIED OVARY: ICD-10-CM

## 2022-09-30 ENCOUNTER — RESULT REVIEW (OUTPATIENT)
Age: 57
End: 2022-09-30

## 2022-09-30 ENCOUNTER — APPOINTMENT (OUTPATIENT)
Dept: MAMMOGRAPHY | Facility: IMAGING CENTER | Age: 57
End: 2022-09-30

## 2022-09-30 ENCOUNTER — APPOINTMENT (OUTPATIENT)
Dept: HEMATOLOGY ONCOLOGY | Facility: CLINIC | Age: 57
End: 2022-09-30

## 2022-09-30 ENCOUNTER — OUTPATIENT (OUTPATIENT)
Dept: OUTPATIENT SERVICES | Facility: HOSPITAL | Age: 57
LOS: 1 days | End: 2022-09-30
Payer: MEDICAID

## 2022-09-30 VITALS
HEART RATE: 73 BPM | RESPIRATION RATE: 16 BRPM | SYSTOLIC BLOOD PRESSURE: 173 MMHG | OXYGEN SATURATION: 98 % | TEMPERATURE: 97.3 F | HEIGHT: 65 IN | WEIGHT: 153.22 LBS | DIASTOLIC BLOOD PRESSURE: 98 MMHG | BODY MASS INDEX: 25.53 KG/M2

## 2022-09-30 DIAGNOSIS — C56.9 MALIGNANT NEOPLASM OF UNSPECIFIED OVARY: ICD-10-CM

## 2022-09-30 DIAGNOSIS — Z00.8 ENCOUNTER FOR OTHER GENERAL EXAMINATION: ICD-10-CM

## 2022-09-30 DIAGNOSIS — Z98.890 OTHER SPECIFIED POSTPROCEDURAL STATES: Chronic | ICD-10-CM

## 2022-09-30 DIAGNOSIS — Z98.89 OTHER SPECIFIED POSTPROCEDURAL STATES: Chronic | ICD-10-CM

## 2022-09-30 DIAGNOSIS — Z90.710 ACQUIRED ABSENCE OF BOTH CERVIX AND UTERUS: Chronic | ICD-10-CM

## 2022-09-30 LAB
BASOPHILS # BLD AUTO: 0.04 K/UL — SIGNIFICANT CHANGE UP (ref 0–0.2)
BASOPHILS NFR BLD AUTO: 0.5 % — SIGNIFICANT CHANGE UP (ref 0–2)
EOSINOPHIL # BLD AUTO: 0.16 K/UL — SIGNIFICANT CHANGE UP (ref 0–0.5)
EOSINOPHIL NFR BLD AUTO: 1.8 % — SIGNIFICANT CHANGE UP (ref 0–6)
HCT VFR BLD CALC: 39.7 % — SIGNIFICANT CHANGE UP (ref 34.5–45)
HGB BLD-MCNC: 12.7 G/DL — SIGNIFICANT CHANGE UP (ref 11.5–15.5)
IMM GRANULOCYTES NFR BLD AUTO: 0.3 % — SIGNIFICANT CHANGE UP (ref 0–0.9)
LYMPHOCYTES # BLD AUTO: 2.05 K/UL — SIGNIFICANT CHANGE UP (ref 1–3.3)
LYMPHOCYTES # BLD AUTO: 23.4 % — SIGNIFICANT CHANGE UP (ref 13–44)
MCHC RBC-ENTMCNC: 28.9 PG — SIGNIFICANT CHANGE UP (ref 27–34)
MCHC RBC-ENTMCNC: 32 G/DL — SIGNIFICANT CHANGE UP (ref 32–36)
MCV RBC AUTO: 90.2 FL — SIGNIFICANT CHANGE UP (ref 80–100)
MONOCYTES # BLD AUTO: 0.61 K/UL — SIGNIFICANT CHANGE UP (ref 0–0.9)
MONOCYTES NFR BLD AUTO: 7 % — SIGNIFICANT CHANGE UP (ref 2–14)
NEUTROPHILS # BLD AUTO: 5.86 K/UL — SIGNIFICANT CHANGE UP (ref 1.8–7.4)
NEUTROPHILS NFR BLD AUTO: 67 % — SIGNIFICANT CHANGE UP (ref 43–77)
NRBC # BLD: 0 /100 WBCS — SIGNIFICANT CHANGE UP (ref 0–0)
PLATELET # BLD AUTO: 293 K/UL — SIGNIFICANT CHANGE UP (ref 150–400)
RBC # BLD: 4.4 M/UL — SIGNIFICANT CHANGE UP (ref 3.8–5.2)
RBC # FLD: 13.5 % — SIGNIFICANT CHANGE UP (ref 10.3–14.5)
WBC # BLD: 8.75 K/UL — SIGNIFICANT CHANGE UP (ref 3.8–10.5)
WBC # FLD AUTO: 8.75 K/UL — SIGNIFICANT CHANGE UP (ref 3.8–10.5)

## 2022-09-30 PROCEDURE — 77063 BREAST TOMOSYNTHESIS BI: CPT | Mod: 26

## 2022-09-30 PROCEDURE — 77067 SCR MAMMO BI INCL CAD: CPT

## 2022-09-30 PROCEDURE — 99213 OFFICE O/P EST LOW 20 MIN: CPT

## 2022-09-30 PROCEDURE — 77067 SCR MAMMO BI INCL CAD: CPT | Mod: 26

## 2022-09-30 PROCEDURE — 77063 BREAST TOMOSYNTHESIS BI: CPT

## 2022-09-30 NOTE — HISTORY OF PRESENT ILLNESS
[Disease: _____________________] : Disease: [unfilled] [AJCC Stage: ____] : AJCC Stage: [unfilled] [de-identified] : 51-year-old female coming in for recommendation for new diagnosis of her cancer. Patient had a ferritin colonoscopy and she haven't have an ultrasound done that showed an adnexal cyst. She underwent surgical staging on September 12, 2060 and the final pathology showed stage IA, grade 2 ovarian cancer.\par Patient has recovered well from surgery. He currently is asymptomatic. [de-identified] : DOS- 9/12/16\par Genetic test:10/12/16 (49 gene panel) STK11 Variant, Unknown Significance: p.[id number].\par Carboplatin and Taxol 10/19/16 - 11/30/16 ( 3 cycles) [de-identified] : Patient is here for routine follow up.\par She is feeling well, has no new complaints.\par Recently had bone density at [facility] - normal.\par Continues to follow with gyn/onc (Dr. Barber)\par Colonoscopy was done 2016.\par Last mammo was September 2021.

## 2022-10-03 LAB
ALBUMIN SERPL ELPH-MCNC: 4.5 G/DL
ALP BLD-CCNC: 106 U/L
ALT SERPL-CCNC: 31 U/L
ANION GAP SERPL CALC-SCNC: 10 MMOL/L
AST SERPL-CCNC: 25 U/L
BILIRUB SERPL-MCNC: 0.3 MG/DL
BUN SERPL-MCNC: 8 MG/DL
CALCIUM SERPL-MCNC: 9.9 MG/DL
CANCER AG125 SERPL-ACNC: 7 U/ML
CHLORIDE SERPL-SCNC: 105 MMOL/L
CO2 SERPL-SCNC: 25 MMOL/L
CREAT SERPL-MCNC: 0.49 MG/DL
EGFR: 108 ML/MIN/1.73M2
GLUCOSE SERPL-MCNC: 140 MG/DL
POTASSIUM SERPL-SCNC: 4.4 MMOL/L
PROT SERPL-MCNC: 7.5 G/DL
SODIUM SERPL-SCNC: 140 MMOL/L

## 2022-11-02 NOTE — ED STATDOCS - NS ED ATTENDING STATEMENT MOD
normal sinus rhythm I have personally performed a face to face diagnostic evaluation on this patient. I have reviewed the ACP note and agree with the history, exam and plan of care, except as noted.

## 2023-02-01 ENCOUNTER — OFFICE (OUTPATIENT)
Dept: URBAN - METROPOLITAN AREA CLINIC 94 | Facility: CLINIC | Age: 58
Setting detail: OPHTHALMOLOGY
End: 2023-02-01
Payer: COMMERCIAL

## 2023-02-01 DIAGNOSIS — H04.121: ICD-10-CM

## 2023-02-01 DIAGNOSIS — H25.13: ICD-10-CM

## 2023-02-01 DIAGNOSIS — H47.233: ICD-10-CM

## 2023-02-01 DIAGNOSIS — H04.122: ICD-10-CM

## 2023-02-01 DIAGNOSIS — H04.123: ICD-10-CM

## 2023-02-01 PROCEDURE — 92020 GONIOSCOPY: CPT | Performed by: OPHTHALMOLOGY

## 2023-02-01 PROCEDURE — 92083 EXTENDED VISUAL FIELD XM: CPT | Performed by: OPHTHALMOLOGY

## 2023-02-01 PROCEDURE — 92014 COMPRE OPH EXAM EST PT 1/>: CPT | Performed by: OPHTHALMOLOGY

## 2023-02-01 PROCEDURE — 92250 FUNDUS PHOTOGRAPHY W/I&R: CPT | Performed by: OPHTHALMOLOGY

## 2023-02-01 ASSESSMENT — SUPERFICIAL PUNCTATE KERATITIS (SPK)
OD_SPK: 2+
OS_SPK: 2+

## 2023-02-01 ASSESSMENT — REFRACTION_AUTOREFRACTION
OD_CYLINDER: 0.00
OS_CYLINDER: -0.25
OD_AXIS: 000
OS_AXIS: 141
OS_SPHERE: +0.50
OD_SPHERE: +0.25

## 2023-02-01 ASSESSMENT — PACHYMETRY
OD_CT_CORRECTION: -3
OD_CT_UM: 587
OS_CT_CORRECTION: -3
OS_CT_UM: 580

## 2023-02-01 ASSESSMENT — REFRACTION_CURRENTRX
OD_SPHERE: 0.00
OS_CYLINDER: -0.25
OS_AXIS: 098
OD_CYLINDER: -0.25
OD_AXIS: 074
OD_OVR_VA: 20/
OS_OVR_VA: 20/
OS_SPHERE: -0.25

## 2023-02-01 ASSESSMENT — SPHEQUIV_DERIVED
OD_SPHEQUIV: 0.25
OS_SPHEQUIV: 0.375

## 2023-02-01 ASSESSMENT — VISUAL ACUITY
OD_BCVA: 20/20
OS_BCVA: 20/20

## 2023-02-01 ASSESSMENT — KERATOMETRY
OD_K2POWER_DIOPTERS: 43.25
OS_K2POWER_DIOPTERS: 42.75
OD_AXISANGLE_DEGREES: 081
OS_AXISANGLE_DEGREES: 066
OS_K1POWER_DIOPTERS: 42.50
OD_K1POWER_DIOPTERS: 43.00

## 2023-02-01 ASSESSMENT — TONOMETRY
OD_IOP_MMHG: 15
OS_IOP_MMHG: 15

## 2023-02-01 ASSESSMENT — CONFRONTATIONAL VISUAL FIELD TEST (CVF)
OS_FINDINGS: FULL
OD_FINDINGS: FULL

## 2023-02-01 ASSESSMENT — AXIALLENGTH_DERIVED
OS_AL: 23.7685
OD_AL: 23.6323

## 2023-05-26 NOTE — ED ADULT NURSE NOTE - NS ED NURSE RECORD ANOTHER VITAL SIGN
Case Management Discharge Note      Final Note: Patient has order for discharge.  Per RN, patient did not have qualifying O2 sat for home O2.  Spoke with patient at bedside regarding discharge plan.  No needs verbalized.  Patient plan is to discharge home today via car with family to transport.         Selected Continued Care - Admitted Since 5/23/2023     Destination    No services have been selected for the patient.              Durable Medical Equipment    No services have been selected for the patient.              Dialysis/Infusion    No services have been selected for the patient.              Home Medical Care    No services have been selected for the patient.              Therapy    No services have been selected for the patient.              Community Resources    No services have been selected for the patient.              Community & DME    No services have been selected for the patient.                       Final Discharge Disposition Code: 01 - home or self-care   Yes

## 2023-08-01 ENCOUNTER — APPOINTMENT (OUTPATIENT)
Dept: GYNECOLOGIC ONCOLOGY | Facility: CLINIC | Age: 58
End: 2023-08-01
Payer: MEDICAID

## 2023-08-01 VITALS
WEIGHT: 156 LBS | DIASTOLIC BLOOD PRESSURE: 84 MMHG | HEART RATE: 72 BPM | HEIGHT: 65 IN | BODY MASS INDEX: 25.99 KG/M2 | SYSTOLIC BLOOD PRESSURE: 166 MMHG

## 2023-08-01 PROCEDURE — 99213 OFFICE O/P EST LOW 20 MIN: CPT

## 2023-08-01 NOTE — LETTER BODY
[Dear  ___] : Dear  [unfilled], [I recently saw our patient [unfilled] for a follow-up visit.] : I recently saw our patient, [unfilled] for a follow-up visit. [Attached please find my note.] : Attached please find my note. [FreeTextEntry2] : She completed adjuvant chemotherapy with Dr. Jarrett in late November 2016 and is currently clinically without evidence of disease. She will see me in 12 months for surveillance exam. I will keep you updated on her progress.

## 2023-08-01 NOTE — PHYSICAL EXAM
[Chaperone Present] : A chaperone was present in the examining room during all aspects of the physical examination [Absent] : Adnexa(ae): Absent [Normal] : Recto-Vaginal Exam: Normal [Fully active, able to carry on all pre-disease performance without restriction] : Status 0 - Fully active, able to carry on all pre-disease performance without restriction [de-identified] : well-healed [de-identified] : s/p hernia repair. wellhealed, NT, no mass [de-identified] : well healed cuff, no lesions [de-identified] : no mass or tenderness [de-identified] : normal sphincter tone, smooth rectovaginal septum, no cul-de-sac nodules.

## 2023-08-01 NOTE — HISTORY OF PRESENT ILLNESS
[FreeTextEntry1] : Ms. 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 She received 3 cycles of adjuvant chemotherapy with IV carbo/taxol, completing cycle #3 on 16. She tolerated treatment well without toxicity. She returns today for a surveillance visit. She feels quite well and denies any associated signs or symptoms. 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 She underwent laparoscopic incisional hernia repair by Dr. Castorena on 17. She had a small postop seroma seen on scans. This enlarged and eventually she required repair of an incarcerated hernia on 19.  She saw him in 2021 again due to an episode of periumbilical pain/N/V that resolved. A CT showed bulging of abdominal wall but no recurrent hernia.   Markers:  at diagnosis: 204 ( was 347) 16 = 10 3/29/17 = 11, CEA = 1.5 17 = 11 10/31/17 = 12 3/12/18 = 9 18 = 11 3/26/19 = 8 19 = 13 20 = 6 3/19/21 = 6 21=9 10/2022: 7  Imagin16 CT A/P: slight increase in size of nodes (left ext iliac, left femoral, subcm on right) and unchanged right peritoneal nodularity.  17 PET/CT: stable non-FDG avid nodes 17 CT A/P: PARVEZ; new periumbilical hernias containing nonobstructed small bowel. 10/15/17 CT A/P: PARVEZ 18 CT A/P: PARVEZ. decreased size of supraumbilical postop seroma (from hernia repair). new infraumbilical abd wall fluid collection that was minimal on 10/2017 scan, unexplained.  18 CT A/P: interval increase in size of right paramedian fluid collection, 6.9 x 3.9cm; stable supraumbilical fluid collection. Otherwise normal scan, PARVEZ.  18 Attempted FNA under sono guidance- high viscosity of fluid limited the aspiration; Cytology: a reactive process is favored.  19 CT A/P: PARVEZ; Slight interval increase in size of fluid collection in anterior abd wall hernia repair site, fluid is now more complex;  question of possible early hernia recurrence - she saw Dr. Castorena and on his exam there is no evidence of hernia recurrence, she will return to see him PRN.  19 Repeat US-guided core biopsy and FNA - BENIGN. 19 CT A/P: increase in size of hernia and fluid collection, now containing unobstructed bowel. otherwise PARVEZ.  3/26/21 [facility] ([facility]): PARVEZ; abdominal wall bulging without recurrent hernia.   She denies any periumbilical pain or bulge, pelvic pain, VB or abnormal discharge or any other associated signs or symptoms.   HM: PAP: not applicable Mammo:22  BIRADS2; breast sono - 2023 with cysts; 6m breast sono recommended  Colonoscopy: 16 normal; no upper endoscopy BMD: ordered by Dr. Jarrett  Regular GYN: Dr. Keith Ruby; now sees Dr. Comfort Mirza ([place]) PCP/referring: Dr. Enoch Hinds Med Onc: Dr. Jarrett GI: Dr. Cullen  General surgery: Dr. Castorena

## 2023-09-25 ENCOUNTER — OUTPATIENT (OUTPATIENT)
Dept: OUTPATIENT SERVICES | Facility: HOSPITAL | Age: 58
LOS: 1 days | Discharge: HOME | End: 2023-09-25

## 2023-09-25 DIAGNOSIS — C56.9 MALIGNANT NEOPLASM OF UNSPECIFIED OVARY: ICD-10-CM

## 2023-09-25 DIAGNOSIS — Z98.890 OTHER SPECIFIED POSTPROCEDURAL STATES: Chronic | ICD-10-CM

## 2023-09-25 DIAGNOSIS — Z98.89 OTHER SPECIFIED POSTPROCEDURAL STATES: Chronic | ICD-10-CM

## 2023-09-25 DIAGNOSIS — Z90.710 ACQUIRED ABSENCE OF BOTH CERVIX AND UTERUS: Chronic | ICD-10-CM

## 2023-10-03 ENCOUNTER — APPOINTMENT (OUTPATIENT)
Dept: HEMATOLOGY ONCOLOGY | Facility: CLINIC | Age: 58
End: 2023-10-03
Payer: MEDICAID

## 2023-10-03 ENCOUNTER — RESULT REVIEW (OUTPATIENT)
Age: 58
End: 2023-10-03

## 2023-10-03 VITALS
TEMPERATURE: 97.7 F | DIASTOLIC BLOOD PRESSURE: 95 MMHG | BODY MASS INDEX: 25.78 KG/M2 | SYSTOLIC BLOOD PRESSURE: 184 MMHG | RESPIRATION RATE: 17 BRPM | OXYGEN SATURATION: 97 % | HEIGHT: 65 IN | WEIGHT: 154.74 LBS | HEART RATE: 68 BPM

## 2023-10-03 DIAGNOSIS — C56.9 MALIGNANT NEOPLASM OF UNSPECIFIED OVARY: ICD-10-CM

## 2023-10-03 LAB
BASOPHILS # BLD AUTO: 0.05 K/UL — SIGNIFICANT CHANGE UP (ref 0–0.2)
BASOPHILS NFR BLD AUTO: 0.7 % — SIGNIFICANT CHANGE UP (ref 0–2)
EOSINOPHIL # BLD AUTO: 0.12 K/UL — SIGNIFICANT CHANGE UP (ref 0–0.5)
EOSINOPHIL NFR BLD AUTO: 1.6 % — SIGNIFICANT CHANGE UP (ref 0–6)
HCT VFR BLD CALC: 38 % — SIGNIFICANT CHANGE UP (ref 34.5–45)
HGB BLD-MCNC: 12.5 G/DL — SIGNIFICANT CHANGE UP (ref 11.5–15.5)
IMM GRANULOCYTES NFR BLD AUTO: 0.3 % — SIGNIFICANT CHANGE UP (ref 0–0.9)
LYMPHOCYTES # BLD AUTO: 1.94 K/UL — SIGNIFICANT CHANGE UP (ref 1–3.3)
LYMPHOCYTES # BLD AUTO: 26.1 % — SIGNIFICANT CHANGE UP (ref 13–44)
MCHC RBC-ENTMCNC: 29.3 PG — SIGNIFICANT CHANGE UP (ref 27–34)
MCHC RBC-ENTMCNC: 32.9 G/DL — SIGNIFICANT CHANGE UP (ref 32–36)
MCV RBC AUTO: 89.2 FL — SIGNIFICANT CHANGE UP (ref 80–100)
MONOCYTES # BLD AUTO: 0.5 K/UL — SIGNIFICANT CHANGE UP (ref 0–0.9)
MONOCYTES NFR BLD AUTO: 6.7 % — SIGNIFICANT CHANGE UP (ref 2–14)
NEUTROPHILS # BLD AUTO: 4.81 K/UL — SIGNIFICANT CHANGE UP (ref 1.8–7.4)
NEUTROPHILS NFR BLD AUTO: 64.6 % — SIGNIFICANT CHANGE UP (ref 43–77)
NRBC # BLD: 0 /100 WBCS — SIGNIFICANT CHANGE UP (ref 0–0)
PLATELET # BLD AUTO: 280 K/UL — SIGNIFICANT CHANGE UP (ref 150–400)
RBC # BLD: 4.26 M/UL — SIGNIFICANT CHANGE UP (ref 3.8–5.2)
RBC # FLD: 13.6 % — SIGNIFICANT CHANGE UP (ref 10.3–14.5)
WBC # BLD: 7.44 K/UL — SIGNIFICANT CHANGE UP (ref 3.8–10.5)
WBC # FLD AUTO: 7.44 K/UL — SIGNIFICANT CHANGE UP (ref 3.8–10.5)

## 2023-10-03 PROCEDURE — 99213 OFFICE O/P EST LOW 20 MIN: CPT

## 2023-10-03 NOTE — H&P PST ADULT - OPHTHALMOLOGIC
Continued Stay SW/CM Assessment/Plan of Care Note       Active Substitute Decision Maker (SDM)    There are no active Substitute Decision Maker (SDM) on file.           Progress note:  Pt and daughter Missy met with Jovanni Valverde [phone] negative

## 2023-10-04 LAB
ALBUMIN SERPL ELPH-MCNC: 4.3 G/DL
ALP BLD-CCNC: 84 U/L
ALT SERPL-CCNC: 22 U/L
ANION GAP SERPL CALC-SCNC: 11 MMOL/L
AST SERPL-CCNC: 19 U/L
BILIRUB SERPL-MCNC: 0.4 MG/DL
BUN SERPL-MCNC: 9 MG/DL
CALCIUM SERPL-MCNC: 10.1 MG/DL
CANCER AG125 SERPL-ACNC: 7 U/ML
CHLORIDE SERPL-SCNC: 103 MMOL/L
CO2 SERPL-SCNC: 25 MMOL/L
CREAT SERPL-MCNC: 0.52 MG/DL
EGFR: 106 ML/MIN/1.73M2
GLUCOSE SERPL-MCNC: 133 MG/DL
POTASSIUM SERPL-SCNC: 4.3 MMOL/L
PROT SERPL-MCNC: 7 G/DL
SODIUM SERPL-SCNC: 139 MMOL/L

## 2023-11-07 ENCOUNTER — RESULT REVIEW (OUTPATIENT)
Age: 58
End: 2023-11-07

## 2023-11-07 ENCOUNTER — OUTPATIENT (OUTPATIENT)
Dept: OUTPATIENT SERVICES | Facility: HOSPITAL | Age: 58
LOS: 1 days | End: 2023-11-07
Payer: MEDICAID

## 2023-11-07 ENCOUNTER — APPOINTMENT (OUTPATIENT)
Dept: MAMMOGRAPHY | Facility: CLINIC | Age: 58
End: 2023-11-07
Payer: MEDICAID

## 2023-11-07 ENCOUNTER — APPOINTMENT (OUTPATIENT)
Dept: ULTRASOUND IMAGING | Facility: CLINIC | Age: 58
End: 2023-11-07
Payer: MEDICAID

## 2023-11-07 DIAGNOSIS — Z98.89 OTHER SPECIFIED POSTPROCEDURAL STATES: Chronic | ICD-10-CM

## 2023-11-07 DIAGNOSIS — Z90.710 ACQUIRED ABSENCE OF BOTH CERVIX AND UTERUS: Chronic | ICD-10-CM

## 2023-11-07 DIAGNOSIS — Z00.8 ENCOUNTER FOR OTHER GENERAL EXAMINATION: ICD-10-CM

## 2023-11-07 DIAGNOSIS — Z98.890 OTHER SPECIFIED POSTPROCEDURAL STATES: Chronic | ICD-10-CM

## 2023-11-07 PROCEDURE — 76641 ULTRASOUND BREAST COMPLETE: CPT | Mod: 26,50

## 2023-11-07 PROCEDURE — 77063 BREAST TOMOSYNTHESIS BI: CPT | Mod: 26

## 2023-11-07 PROCEDURE — 77067 SCR MAMMO BI INCL CAD: CPT

## 2023-11-07 PROCEDURE — 77067 SCR MAMMO BI INCL CAD: CPT | Mod: 26

## 2023-11-07 PROCEDURE — 76641 ULTRASOUND BREAST COMPLETE: CPT

## 2023-11-07 PROCEDURE — 77063 BREAST TOMOSYNTHESIS BI: CPT

## 2024-02-28 ENCOUNTER — OFFICE (OUTPATIENT)
Dept: URBAN - METROPOLITAN AREA CLINIC 94 | Facility: CLINIC | Age: 59
Setting detail: OPHTHALMOLOGY
End: 2024-02-28
Payer: COMMERCIAL

## 2024-02-28 DIAGNOSIS — H47.233: ICD-10-CM

## 2024-02-28 DIAGNOSIS — H04.123: ICD-10-CM

## 2024-02-28 DIAGNOSIS — H25.13: ICD-10-CM

## 2024-02-28 PROCEDURE — 92083 EXTENDED VISUAL FIELD XM: CPT | Performed by: OPHTHALMOLOGY

## 2024-02-28 PROCEDURE — 92133 CPTRZD OPH DX IMG PST SGM ON: CPT | Performed by: OPHTHALMOLOGY

## 2024-02-28 PROCEDURE — 92020 GONIOSCOPY: CPT | Performed by: OPHTHALMOLOGY

## 2024-02-28 PROCEDURE — 92014 COMPRE OPH EXAM EST PT 1/>: CPT | Performed by: OPHTHALMOLOGY

## 2024-02-28 ASSESSMENT — REFRACTION_AUTOREFRACTION
OD_SPHERE: PLANO
OS_CYLINDER: -0.25
OS_AXIS: 097
OD_CYLINDER: -0.75
OS_SPHERE: +0.50
OD_AXIS: 004

## 2024-02-28 ASSESSMENT — SUPERFICIAL PUNCTATE KERATITIS (SPK)
OD_SPK: 2+
OS_SPK: 2+

## 2024-02-28 ASSESSMENT — CONFRONTATIONAL VISUAL FIELD TEST (CVF)
OD_FINDINGS: FULL
OS_FINDINGS: FULL

## 2024-02-28 ASSESSMENT — SPHEQUIV_DERIVED: OS_SPHEQUIV: 0.375

## 2024-02-28 ASSESSMENT — REFRACTION_CURRENTRX
OD_SPHERE: 0.00
OS_OVR_VA: 20/
OD_OVR_VA: 20/
OS_SPHERE: -0.25
OD_CYLINDER: -0.25
OS_CYLINDER: -0.25
OS_AXIS: 098
OD_AXIS: 074

## 2024-06-03 NOTE — ED ADULT TRIAGE NOTE - DIRECT TO ROOM CARE INITIATED:
Chest x-ray vaguely suggest excessive fluid.  Would recommend Maxzide 50/75-1 daily.  BNP check at week after starting Maxzide.  Will have the staff notify.  
10-Sep-2019 00:36

## 2024-08-05 ENCOUNTER — NON-APPOINTMENT (OUTPATIENT)
Age: 59
End: 2024-08-05

## 2024-08-06 ENCOUNTER — APPOINTMENT (OUTPATIENT)
Dept: GYNECOLOGIC ONCOLOGY | Facility: CLINIC | Age: 59
End: 2024-08-06

## 2024-08-06 PROBLEM — N93.0 POSTCOITAL BLEEDING: Status: ACTIVE | Noted: 2024-08-06

## 2024-08-06 PROCEDURE — 99213 OFFICE O/P EST LOW 20 MIN: CPT

## 2024-08-06 PROCEDURE — 99459 PELVIC EXAMINATION: CPT

## 2024-08-06 NOTE — PHYSICAL EXAM
[Chaperone Present] : A chaperone was present in the examining room during all aspects of the physical examination [53232] : A chaperone was present during the pelvic exam. [FreeTextEntry2] : Ariana [Absent] : Adnexa(ae): Absent [Normal] : Recto-Vaginal Exam: Normal [de-identified] : well-healed [de-identified] : s/p hernia repair. wellhealed, NT, no mass [de-identified] : slight trauma at introitus, healing small laceration on left introitus; well healed cuff, no lesions [de-identified] : no mass or tenderness [de-identified] : normal sphincter tone, smooth rectovaginal septum, no cul-de-sac nodules. [Fully active, able to carry on all pre-disease performance without restriction] : Status 0 - Fully active, able to carry on all pre-disease performance without restriction

## 2024-08-06 NOTE — ASSESSMENT
[FreeTextEntry1] :  58y/o with stage IA FIGO grade 2 endometrioid ovarian carcinoma, s/p surgery and adjuvant chemotherapy, clinically without evidence of disease.

## 2024-08-06 NOTE — DISCUSSION/SUMMARY
[FreeTextEntry1] :  - exam findings reviewed - Risk, signs and symptoms of recurrence were reviewed. - Surveillance plan discussed. F/u with Dr. Jarrett as scheduled. She will continue with annual GYN HM with Dr. Mirza including breast imaging in October. - H/o hernia repair discussed; f/u with Dr. Castorena PRN. - HM reviewed. - f/u 12 months , alternating q6m with her GYN. - All questions were answered.

## 2024-08-06 NOTE — HISTORY OF PRESENT ILLNESS
[FreeTextEntry1] : Ms. 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 She received 3 cycles of adjuvant chemotherapy with IV carbo/taxol, completing cycle #3 on 16. She tolerated treatment well without toxicity. She returns today for a surveillance visit. She feels quite well and denies any associated signs or symptoms. 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 She underwent laparoscopic incisional hernia repair by Dr. Castorena on 17. She had a small postop seroma seen on scans. This enlarged and eventually she required repair of an incarcerated hernia on 19.  She saw him in 2021 again due to an episode of periumbilical pain/N/V that resolved. A CT showed bulging of abdominal wall but no recurrent hernia.   Markers:  at diagnosis: 204 ( was 347) 16 = 10 3/29/17 = 11, CEA = 1.5 17 = 11 10/31/17 = 12 3/12/18 = 9 18 = 11 3/26/19 = 8 19 = 13 20 = 6 3/19/21 = 6 21=9 10/2022: 7 10/2023: 7  Imagin16 CT A/P: slight increase in size of nodes (left ext iliac, left femoral, subcm on right) and unchanged right peritoneal nodularity.  17 PET/CT: stable non-FDG avid nodes 17 CT A/P: PARVEZ; new periumbilical hernias containing nonobstructed small bowel. 10/15/17 CT A/P: PARVEZ 18 CT A/P: PARVEZ. decreased size of supraumbilical postop seroma (from hernia repair). new infraumbilical abd wall fluid collection that was minimal on 10/2017 scan, unexplained.  18 CT A/P: interval increase in size of right paramedian fluid collection, 6.9 x 3.9cm; stable supraumbilical fluid collection. Otherwise normal scan, PARVEZ.  18 Attempted FNA under sono guidance- high viscosity of fluid limited the aspiration; Cytology: a reactive process is favored.  19 CT A/P: PARVEZ; Slight interval increase in size of fluid collection in anterior abd wall hernia repair site, fluid is now more complex;  question of possible early hernia recurrence - she saw Dr. Castorena and on his exam there is no evidence of hernia recurrence, she will return to see him PRN.  19 Repeat US-guided core biopsy and FNA - BENIGN. 19 CT A/P: increase in size of hernia and fluid collection, now containing unobstructed bowel. otherwise PARVEZ.  3/26/21 CT A/P ([facility]): PARVEZ; abdominal wall bulging without recurrent hernia.   She noticed some postcoital spotting the past 1-2 weeks. She denies any periumbilical pain or bulge, pelvic pain or abnormal discharge or any other associated signs or symptoms.   HM: PAP: not applicable Mammo:2023 BIRADS2; breast health per GYN Colonoscopy: 2024 normal per pt; no upper endoscopy BMD: ordered by Dr. Jarrett  Regular GYN: Dr. Keith Ruby; now sees Dr. Comfort Mirza ([place]) PCP/referring: Dr. Luciano Suárez Med Onc: Dr. Jarrett GI: previously Dr. Cullen - has new GI General surgery: Dr. Castorena

## 2024-09-26 ENCOUNTER — OUTPATIENT (OUTPATIENT)
Dept: OUTPATIENT SERVICES | Facility: HOSPITAL | Age: 59
LOS: 1 days | Discharge: HOME | End: 2024-09-26

## 2024-09-26 DIAGNOSIS — Z90.710 ACQUIRED ABSENCE OF BOTH CERVIX AND UTERUS: Chronic | ICD-10-CM

## 2024-09-26 DIAGNOSIS — Z98.890 OTHER SPECIFIED POSTPROCEDURAL STATES: Chronic | ICD-10-CM

## 2024-09-26 DIAGNOSIS — C56.9 MALIGNANT NEOPLASM OF UNSPECIFIED OVARY: ICD-10-CM

## 2024-09-26 DIAGNOSIS — Z98.89 OTHER SPECIFIED POSTPROCEDURAL STATES: Chronic | ICD-10-CM

## 2024-10-01 NOTE — H&P PST ADULT - OPHTHALMOLOGIC
Spoke with pt's mom and let her know the [facility] hotel reserved for them to stay on the night of 10/7. Portal message sent.   negative

## 2024-10-08 ENCOUNTER — RESULT REVIEW (OUTPATIENT)
Age: 59
End: 2024-10-08

## 2024-10-08 ENCOUNTER — APPOINTMENT (OUTPATIENT)
Dept: HEMATOLOGY ONCOLOGY | Facility: CLINIC | Age: 59
End: 2024-10-08
Payer: MEDICAID

## 2024-10-08 VITALS
SYSTOLIC BLOOD PRESSURE: 158 MMHG | OXYGEN SATURATION: 98 % | TEMPERATURE: 97.9 F | HEART RATE: 70 BPM | WEIGHT: 149.25 LBS | DIASTOLIC BLOOD PRESSURE: 87 MMHG | RESPIRATION RATE: 16 BRPM | BODY MASS INDEX: 24.84 KG/M2

## 2024-10-08 DIAGNOSIS — Z00.00 ENCOUNTER FOR GENERAL ADULT MEDICAL EXAMINATION W/OUT ABNORMAL FINDINGS: ICD-10-CM

## 2024-10-08 DIAGNOSIS — C56.9 MALIGNANT NEOPLASM OF UNSPECIFIED OVARY: ICD-10-CM

## 2024-10-08 LAB
BASOPHILS # BLD AUTO: 0.07 K/UL — SIGNIFICANT CHANGE UP (ref 0–0.2)
BASOPHILS NFR BLD AUTO: 0.9 % — SIGNIFICANT CHANGE UP (ref 0–2)
EOSINOPHIL # BLD AUTO: 0.15 K/UL — SIGNIFICANT CHANGE UP (ref 0–0.5)
EOSINOPHIL NFR BLD AUTO: 1.9 % — SIGNIFICANT CHANGE UP (ref 0–6)
HCT VFR BLD CALC: 39.1 % — SIGNIFICANT CHANGE UP (ref 34.5–45)
HGB BLD-MCNC: 12.7 G/DL — SIGNIFICANT CHANGE UP (ref 11.5–15.5)
IMM GRANULOCYTES NFR BLD AUTO: 0.2 % — SIGNIFICANT CHANGE UP (ref 0–0.9)
LYMPHOCYTES # BLD AUTO: 1.98 K/UL — SIGNIFICANT CHANGE UP (ref 1–3.3)
LYMPHOCYTES # BLD AUTO: 24.6 % — SIGNIFICANT CHANGE UP (ref 13–44)
MCHC RBC-ENTMCNC: 28.8 PG — SIGNIFICANT CHANGE UP (ref 27–34)
MCHC RBC-ENTMCNC: 32.5 G/DL — SIGNIFICANT CHANGE UP (ref 32–36)
MCV RBC AUTO: 88.7 FL — SIGNIFICANT CHANGE UP (ref 80–100)
MONOCYTES # BLD AUTO: 0.52 K/UL — SIGNIFICANT CHANGE UP (ref 0–0.9)
MONOCYTES NFR BLD AUTO: 6.5 % — SIGNIFICANT CHANGE UP (ref 2–14)
NEUTROPHILS # BLD AUTO: 5.31 K/UL — SIGNIFICANT CHANGE UP (ref 1.8–7.4)
NEUTROPHILS NFR BLD AUTO: 65.9 % — SIGNIFICANT CHANGE UP (ref 43–77)
NRBC # BLD: 0 /100 WBCS — SIGNIFICANT CHANGE UP (ref 0–0)
NRBC BLD-RTO: 0 /100 WBCS — SIGNIFICANT CHANGE UP (ref 0–0)
PLATELET # BLD AUTO: 304 K/UL — SIGNIFICANT CHANGE UP (ref 150–400)
RBC # BLD: 4.41 M/UL — SIGNIFICANT CHANGE UP (ref 3.8–5.2)
RBC # FLD: 13.8 % — SIGNIFICANT CHANGE UP (ref 10.3–14.5)
WBC # BLD: 8.05 K/UL — SIGNIFICANT CHANGE UP (ref 3.8–10.5)
WBC # FLD AUTO: 8.05 K/UL — SIGNIFICANT CHANGE UP (ref 3.8–10.5)

## 2024-10-08 PROCEDURE — 99213 OFFICE O/P EST LOW 20 MIN: CPT

## 2024-10-09 LAB
ALBUMIN SERPL ELPH-MCNC: 4.3 G/DL
ALP BLD-CCNC: 84 U/L
ALT SERPL-CCNC: 17 U/L
ANION GAP SERPL CALC-SCNC: 10 MMOL/L
AST SERPL-CCNC: 19 U/L
BILIRUB SERPL-MCNC: 0.3 MG/DL
BUN SERPL-MCNC: 13 MG/DL
CALCIUM SERPL-MCNC: 10 MG/DL
CANCER AG125 SERPL-ACNC: 6 U/ML
CHLORIDE SERPL-SCNC: 106 MMOL/L
CO2 SERPL-SCNC: 26 MMOL/L
CREAT SERPL-MCNC: 0.54 MG/DL
EGFR: 105 ML/MIN/1.73M2
GLUCOSE SERPL-MCNC: 124 MG/DL
POTASSIUM SERPL-SCNC: 4.8 MMOL/L
PROT SERPL-MCNC: 7.5 G/DL
SODIUM SERPL-SCNC: 142 MMOL/L

## 2024-11-08 ENCOUNTER — APPOINTMENT (OUTPATIENT)
Dept: ULTRASOUND IMAGING | Facility: CLINIC | Age: 59
End: 2024-11-08

## 2024-11-08 ENCOUNTER — APPOINTMENT (OUTPATIENT)
Dept: MAMMOGRAPHY | Facility: CLINIC | Age: 59
End: 2024-11-08

## 2024-11-09 ENCOUNTER — RESULT REVIEW (OUTPATIENT)
Age: 59
End: 2024-11-09

## 2024-11-09 ENCOUNTER — APPOINTMENT (OUTPATIENT)
Dept: ULTRASOUND IMAGING | Facility: CLINIC | Age: 59
End: 2024-11-09

## 2024-11-09 ENCOUNTER — APPOINTMENT (OUTPATIENT)
Dept: MAMMOGRAPHY | Facility: CLINIC | Age: 59
End: 2024-11-09

## 2024-11-09 ENCOUNTER — OUTPATIENT (OUTPATIENT)
Dept: OUTPATIENT SERVICES | Facility: HOSPITAL | Age: 59
LOS: 1 days | End: 2024-11-09
Payer: MEDICAID

## 2024-11-09 DIAGNOSIS — Z98.890 OTHER SPECIFIED POSTPROCEDURAL STATES: Chronic | ICD-10-CM

## 2024-11-09 DIAGNOSIS — Z90.710 ACQUIRED ABSENCE OF BOTH CERVIX AND UTERUS: Chronic | ICD-10-CM

## 2024-11-09 DIAGNOSIS — Z00.8 ENCOUNTER FOR OTHER GENERAL EXAMINATION: ICD-10-CM

## 2024-11-09 DIAGNOSIS — Z98.89 OTHER SPECIFIED POSTPROCEDURAL STATES: Chronic | ICD-10-CM

## 2024-11-09 PROCEDURE — 77063 BREAST TOMOSYNTHESIS BI: CPT | Mod: 26

## 2024-11-09 PROCEDURE — 77067 SCR MAMMO BI INCL CAD: CPT | Mod: 26

## 2024-11-09 PROCEDURE — 76641 ULTRASOUND BREAST COMPLETE: CPT | Mod: 26,50

## 2024-11-09 PROCEDURE — 76641 ULTRASOUND BREAST COMPLETE: CPT

## 2024-11-09 PROCEDURE — 77063 BREAST TOMOSYNTHESIS BI: CPT

## 2024-11-09 PROCEDURE — 77067 SCR MAMMO BI INCL CAD: CPT

## 2024-11-22 ENCOUNTER — APPOINTMENT (OUTPATIENT)
Dept: ULTRASOUND IMAGING | Facility: CLINIC | Age: 59
End: 2024-11-22
Payer: MEDICAID

## 2024-11-22 ENCOUNTER — RESULT REVIEW (OUTPATIENT)
Age: 59
End: 2024-11-22

## 2024-11-22 ENCOUNTER — OUTPATIENT (OUTPATIENT)
Dept: OUTPATIENT SERVICES | Facility: HOSPITAL | Age: 59
LOS: 1 days | End: 2024-11-22
Payer: MEDICAID

## 2024-11-22 DIAGNOSIS — Z98.89 OTHER SPECIFIED POSTPROCEDURAL STATES: Chronic | ICD-10-CM

## 2024-11-22 DIAGNOSIS — Z90.710 ACQUIRED ABSENCE OF BOTH CERVIX AND UTERUS: Chronic | ICD-10-CM

## 2024-11-22 DIAGNOSIS — Z98.890 OTHER SPECIFIED POSTPROCEDURAL STATES: Chronic | ICD-10-CM

## 2024-11-22 DIAGNOSIS — R92.8 OTHER ABNORMAL AND INCONCLUSIVE FINDINGS ON DIAGNOSTIC IMAGING OF BREAST: ICD-10-CM

## 2024-11-22 DIAGNOSIS — Z00.8 ENCOUNTER FOR OTHER GENERAL EXAMINATION: ICD-10-CM

## 2024-11-22 PROCEDURE — 76642 ULTRASOUND BREAST LIMITED: CPT

## 2024-11-22 PROCEDURE — 76642 ULTRASOUND BREAST LIMITED: CPT | Mod: 26,50

## 2025-04-30 ENCOUNTER — OFFICE (OUTPATIENT)
Dept: URBAN - METROPOLITAN AREA CLINIC 94 | Facility: CLINIC | Age: 60
Setting detail: OPHTHALMOLOGY
End: 2025-04-30
Payer: COMMERCIAL

## 2025-04-30 DIAGNOSIS — H04.123: ICD-10-CM

## 2025-04-30 DIAGNOSIS — H47.233: ICD-10-CM

## 2025-04-30 DIAGNOSIS — H25.13: ICD-10-CM

## 2025-04-30 PROCEDURE — 92014 COMPRE OPH EXAM EST PT 1/>: CPT | Performed by: OPHTHALMOLOGY

## 2025-04-30 PROCEDURE — 92083 EXTENDED VISUAL FIELD XM: CPT | Performed by: OPHTHALMOLOGY

## 2025-04-30 PROCEDURE — 92250 FUNDUS PHOTOGRAPHY W/I&R: CPT | Performed by: OPHTHALMOLOGY

## 2025-04-30 ASSESSMENT — REFRACTION_CURRENTRX
OS_OVR_VA: 20/
OD_SPHERE: 0.00
OS_SPHERE: -0.25
OS_AXIS: 098
OD_CYLINDER: -0.25
OD_OVR_VA: 20/
OS_CYLINDER: -0.25
OD_AXIS: 074

## 2025-04-30 ASSESSMENT — CONFRONTATIONAL VISUAL FIELD TEST (CVF)
OD_FINDINGS: FULL
OS_FINDINGS: FULL

## 2025-04-30 ASSESSMENT — TONOMETRY
OD_IOP_MMHG: 14
OS_IOP_MMHG: 14

## 2025-04-30 ASSESSMENT — VISUAL ACUITY
OD_BCVA: 20/20
OS_BCVA: 20/20

## 2025-04-30 ASSESSMENT — PACHYMETRY
OD_CT_UM: 587
OS_CT_CORRECTION: -3
OS_CT_UM: 580
OD_CT_CORRECTION: -3

## 2025-05-20 ENCOUNTER — APPOINTMENT (OUTPATIENT)
Dept: ULTRASOUND IMAGING | Facility: CLINIC | Age: 60
End: 2025-05-20
Payer: MEDICAID

## 2025-05-20 ENCOUNTER — RESULT REVIEW (OUTPATIENT)
Age: 60
End: 2025-05-20

## 2025-05-20 ENCOUNTER — OUTPATIENT (OUTPATIENT)
Dept: OUTPATIENT SERVICES | Facility: HOSPITAL | Age: 60
LOS: 1 days | End: 2025-05-20
Payer: MEDICAID

## 2025-05-20 DIAGNOSIS — Z98.89 OTHER SPECIFIED POSTPROCEDURAL STATES: Chronic | ICD-10-CM

## 2025-05-20 DIAGNOSIS — R92.8 OTHER ABNORMAL AND INCONCLUSIVE FINDINGS ON DIAGNOSTIC IMAGING OF BREAST: ICD-10-CM

## 2025-05-20 DIAGNOSIS — Z90.710 ACQUIRED ABSENCE OF BOTH CERVIX AND UTERUS: Chronic | ICD-10-CM

## 2025-05-20 DIAGNOSIS — Z98.890 OTHER SPECIFIED POSTPROCEDURAL STATES: Chronic | ICD-10-CM

## 2025-05-20 PROCEDURE — 76642 ULTRASOUND BREAST LIMITED: CPT

## 2025-05-20 PROCEDURE — 76642 ULTRASOUND BREAST LIMITED: CPT | Mod: 26,RT

## 2025-08-12 ENCOUNTER — APPOINTMENT (OUTPATIENT)
Dept: GYNECOLOGIC ONCOLOGY | Facility: CLINIC | Age: 60
End: 2025-08-12
Payer: MEDICAID

## 2025-08-12 VITALS
RESPIRATION RATE: 16 BRPM | HEART RATE: 71 BPM | BODY MASS INDEX: 24.32 KG/M2 | OXYGEN SATURATION: 99 % | DIASTOLIC BLOOD PRESSURE: 79 MMHG | WEIGHT: 146 LBS | HEIGHT: 65 IN | SYSTOLIC BLOOD PRESSURE: 149 MMHG

## 2025-08-12 DIAGNOSIS — C56.9 MALIGNANT NEOPLASM OF UNSPECIFIED OVARY: ICD-10-CM

## 2025-08-12 PROCEDURE — 99213 OFFICE O/P EST LOW 20 MIN: CPT

## 2025-08-12 PROCEDURE — 99459 PELVIC EXAMINATION: CPT

## 2025-08-12 RX ORDER — METFORMIN HYDROCHLORIDE 500 MG/1
500 TABLET, COATED ORAL
Refills: 0 | Status: ACTIVE | COMMUNITY